# Patient Record
Sex: FEMALE | Race: WHITE | Employment: FULL TIME | URBAN - METROPOLITAN AREA
[De-identification: names, ages, dates, MRNs, and addresses within clinical notes are randomized per-mention and may not be internally consistent; named-entity substitution may affect disease eponyms.]

---

## 2017-01-25 ENCOUNTER — OFFICE VISIT (OUTPATIENT)
Dept: FAMILY MEDICINE CLINIC | Age: 37
End: 2017-01-25

## 2017-01-25 VITALS
SYSTOLIC BLOOD PRESSURE: 141 MMHG | HEIGHT: 63 IN | WEIGHT: 157 LBS | RESPIRATION RATE: 18 BRPM | BODY MASS INDEX: 27.82 KG/M2 | DIASTOLIC BLOOD PRESSURE: 88 MMHG | HEART RATE: 80 BPM | OXYGEN SATURATION: 99 % | TEMPERATURE: 97.3 F

## 2017-01-25 DIAGNOSIS — F98.8 ADD (ATTENTION DEFICIT DISORDER): Primary | ICD-10-CM

## 2017-01-25 DIAGNOSIS — F41.9 ANXIETY: ICD-10-CM

## 2017-01-25 DIAGNOSIS — Z86.32 HISTORY OF GESTATIONAL DIABETES: ICD-10-CM

## 2017-01-25 RX ORDER — ESCITALOPRAM OXALATE 10 MG/1
15 TABLET ORAL DAILY
Qty: 45 TAB | Refills: 1 | Status: SHIPPED | OUTPATIENT
Start: 2017-01-25 | End: 2020-01-18

## 2017-01-25 RX ORDER — LORAZEPAM 0.5 MG/1
0.5 TABLET ORAL
Qty: 30 TAB | Refills: 0 | Status: SHIPPED | OUTPATIENT
Start: 2017-01-25 | End: 2017-03-24

## 2017-01-25 NOTE — PROGRESS NOTES
Off Vyvanse 3 mos from Sept to December. Started back up December. Takes Ativan rarely. Takes Vyvanse daily. [address] printout reviewed. Nurse history read and confirmed by patient. Visit Vitals    /88    Pulse 80    Temp 97.3 °F (36.3 °C)    Resp 18    Ht 5' 3\" (1.6 m)    Wt 157 lb (71.2 kg)    SpO2 99%    Breastfeeding No    BMI 27.81 kg/m2       Patient alert and cooperative. Reviewed above. Assessment:  1. ADD, on chronic Vyvanse. 2. Uses Ativan sparingly for anxiety. On Lexapro. Plan:  1. Refilled meds, Vyvanse for two months pending appointment set up in March with Dr. Rosalinda Dimas, replacing Dr. Lisa He. 2. Follow otherwise here prn.

## 2017-01-25 NOTE — PROGRESS NOTES
Here for recheck and refills. She has an appt on the schedule for the end of March. Has several meds on her med list that need to be removed. She works across [place] at [employer]  Other than her GYN she has not seen any other providers.

## 2017-01-25 NOTE — MR AVS SNAPSHOT
Visit Information Date & Time Provider Department Dept. Phone Encounter #  
 1/25/2017  3:45 PM Camilo Cash MD [facility] [phone] [id number] Follow-up Instructions Return if symptoms worsen or fail to improve, for Has appt with Dr. Eliecer Gong in March. Routing History Your Appointments 3/24/2017  2:00 PM  
New Patient with Cielo Byrne. Eliecer Gong, Danielle 30 ([address]) Appt Note: new patient/get acq; new pt est delroy per Dr Eliecer Gong [address] [facility] [id number]  
[phone]  
  
   
 [address] [facility] [address] [facility] [address] [facility] Upcoming Health Maintenance Date Due  
 PAP AKA CERVICAL CYTOLOGY 12/11/2015 INFLUENZA AGE 9 TO ADULT 2/25/2017* DTaP/Tdap/Td series (2 - Td) 6/1/2019 *Topic was postponed. The date shown is not the original due date. Allergies as of 1/25/2017  Review Complete On: 1/25/2017 By: Giovanna Prater LPN Severity Noted Reaction Type Reactions Prednisone  02/05/2010    Rash Strattera [Atomoxetine]  02/05/2010    Rash Current Immunizations  Reviewed on 9/26/2014 Name Date H1N1 FLU VACCINE 11/1/2009 Human Papillomavirus 6/1/2005, 2/1/2005, 1/1/2005 Influenza Vaccine 9/26/2014 Influenza Vaccine Nasal 11/1/2009 Influenza Vaccine Split 10/5/2010 Rho(D) Immune Globulin - IM 8/10/2016  6:24 PM  
 TDAP Vaccine 6/1/2009 Not reviewed this visit You Were Diagnosed With   
  
 Codes Comments ADD (attention deficit disorder)    -  Primary ICD-10-CM: F98.8 ICD-9-CM: 314.00 Anxiety     ICD-10-CM: F41.9 ICD-9-CM: 300.00 History of gestational diabetes     ICD-10-CM: Z86.32 
ICD-9-CM: V12.21 Vitals BP Pulse Temp Resp Height(growth percentile) Weight(growth percentile) 141/88 80 97.3 °F (36.3 °C) 18 5' 3\" (1.6 m) 157 lb (71.2 kg) SpO2 Breastfeeding? BMI OB Status Smoking Status 99% No 27.81 kg/m2 Injection Never Smoker Vitals History BMI and BSA Data Body Mass Index Body Surface Area  
 27.81 kg/m 2 1.78 m 2 Preferred Pharmacy Pharmacy Name Phone [facility], [address] [phone] Your Updated Medication List  
  
   
This list is accurate as of: 1/25/17  4:27 PM.  Always use your most recent med list.  
  
  
  
  
 escitalopram oxalate 10 mg tablet Commonly known as:  Bourne Bias Take 1.5 Tabs by mouth daily. * lisdexamfetamine 30 mg capsule Commonly known as:  VYVANSE Take 1 Cap (30 mg total) by mouth every morningEarliest Fill Date: 1/25/17. Max Daily Amount: 30 mg  
  
 * lisdexamfetamine 30 mg capsule Commonly known as:  VYVANSE Take 1 Cap (30 mg total) by mouth every morningEarliest Fill Date: 2/21/17. Max Daily Amount: 30 mg  
Start taking on:  2/21/2017 LORazepam 0.5 mg tablet Commonly known as:  ATIVAN Take 1 Tab by mouth every eight (8) hours as needed for Anxiety. Max Daily Amount: 1.5 mg.  
  
 * Notice: This list has 2 medication(s) that are the same as other medications prescribed for you. Read the directions carefully, and ask your doctor or other care provider to review them with you. Prescriptions Printed Refills  
 lisdexamfetamine (VYVANSE) 30 mg capsule 0 Starting on: 2/21/2017 Sig: Take 1 Cap (30 mg total) by mouth every morningEarliest Fill Date: 2/21/17. Max Daily Amount: 30 mg  
 Class: Print Route: Oral  
 lisdexamfetamine (VYVANSE) 30 mg capsule 0 Sig: Take 1 Cap (30 mg total) by mouth every morningEarliest Fill Date: 1/25/17. Max Daily Amount: 30 mg  
 Class: Print Route: Oral  
 LORazepam (ATIVAN) 0.5 mg tablet 0 Sig: Take 1 Tab by mouth every eight (8) hours as needed for Anxiety. Max Daily Amount: 1.5 mg.  
 Class: Print Route: Oral  
  
Prescriptions Sent to Pharmacy Refills  
 escitalopram oxalate (LEXAPRO) 10 mg tablet 1 Sig: Take 1.5 Tabs by mouth daily. Class: Normal  
 Pharmacy: [facility] [address] AT [address] [place] [address] #: [phone] Route: Oral  
  
Follow-up Instructions Return if symptoms worsen or fail to improve, for Has appt with Dr. Dean Campbell in March. Introducing [facility] & [facility] SERVICES! Dear ERICA: Thank you for requesting a EventRegist account. Our records indicate that you already have an active EventRegist account. You can access your account anytime at [email or web address] Did you know that you can access your hospital and ER discharge instructions at any time in EventRegist? You can also review all of your test results from your hospital stay or ER visit. Additional Information If you have questions, please visit the Frequently Asked Questions section of the EventRegist website at [email or web address] Remember, EventRegist is NOT to be used for urgent needs. For medical emergencies, dial 911. Now available from your iPhone and Android! Please provide this summary of care documentation to your next provider. If you have any questions after today's visit, please call [phone].

## 2017-02-11 ENCOUNTER — HOSPITAL ENCOUNTER (EMERGENCY)
Age: 37
Discharge: HOME | End: 2017-02-11
Attending: EMERGENCY MEDICINE

## 2017-02-11 VITALS
RESPIRATION RATE: 15 BRPM | DIASTOLIC BLOOD PRESSURE: 87 MMHG | TEMPERATURE: 97.2 F | SYSTOLIC BLOOD PRESSURE: 142 MMHG | BODY MASS INDEX: 27.46 KG/M2 | HEIGHT: 63 IN | OXYGEN SATURATION: 100 % | HEART RATE: 100 BPM | WEIGHT: 155 LBS

## 2017-02-11 DIAGNOSIS — R31.9 HEMATURIA: Primary | ICD-10-CM

## 2017-02-11 DIAGNOSIS — R10.9 RT FLANK PAIN: ICD-10-CM

## 2017-02-11 LAB
BILIRUB UR QL: NEGATIVE
GLUCOSE UR QL STRIP.AUTO: NEGATIVE MG/DL
HCG UR QL: NEGATIVE
KETONES UR-MCNC: NEGATIVE MG/DL
LEUKOCYTE ESTERASE UR QL STRIP: NEGATIVE
NITRITE UR QL: NEGATIVE
PH UR: 6 [PH] (ref 5–8)
PROT UR QL: NEGATIVE MG/DL
RBC # UR STRIP: ABNORMAL /UL
SP GR UR: 1.02 (ref 1–1.03)
UROBILINOGEN UR QL: 0.2 EU/DL (ref 0.2–1)

## 2017-02-11 RX ORDER — KETOROLAC TROMETHAMINE 30 MG/ML
15 INJECTION, SOLUTION INTRAMUSCULAR; INTRAVENOUS
Status: COMPLETED | OUTPATIENT
Start: 2017-02-11 | End: 2017-02-11

## 2017-02-11 RX ORDER — ONDANSETRON 4 MG/1
4 TABLET, ORALLY DISINTEGRATING ORAL
Qty: 12 TAB | Refills: 0 | Status: SHIPPED | OUTPATIENT
Start: 2017-02-11 | End: 2017-03-24 | Stop reason: ALTCHOICE

## 2017-02-11 RX ORDER — HYDROCODONE BITARTRATE AND ACETAMINOPHEN 5; 300 MG/1; MG/1
1 TABLET ORAL
Qty: 12 TAB | Refills: 0 | Status: SHIPPED | OUTPATIENT
Start: 2017-02-11 | End: 2017-03-24 | Stop reason: ALTCHOICE

## 2017-02-11 RX ADMIN — KETOROLAC TROMETHAMINE 15 MG: 30 INJECTION, SOLUTION INTRAMUSCULAR; INTRAVENOUS at 18:03

## 2017-02-11 NOTE — UC PROVIDER NOTE
Patient is a 30 y.o. female presenting with back pain. The history is provided by the patient. Back Pain    This is a recurrent (last time as about 2 years ago) problem. The current episode started more than 2 days ago. The problem has been gradually worsening. The problem occurs constantly. Patient reports not work related injury. The pain is associated with no known injury. The pain is present in the right side. The quality of the pain is described as stabbing and aching. Radiates to: radiates up and down some and around her side. The pain is at a severity of 9/10. Exacerbated by: not sure. Associated symptoms comments: Blood in her urine last night and today. Past Medical History   Diagnosis Date    Anemia     Diabetes ([facility] [address]      gestational diabetes- on glyburide    Other ill-defined conditions(799.89)      back problems    Psychiatric disorder      ADD anxiety    Pyelonephritis 2011     by symptmoms - [facility]    [facility] [id number]        Past Surgical History   Procedure Laterality Date    Pr  delivery only       2016         Family History   Problem Relation Age of Onset    Liver Disease Mother      cirrhosis    Hypertension Mother     Alcohol abuse Mother     Hypertension Father     Diabetes Father      on insulin    Cancer Father      lung    Alcohol abuse Father     Alcohol abuse Sister     Anxiety Sister     Depression Sister     Alcohol abuse Brother     Anxiety Brother      on lexapro    Hypertension Maternal Grandmother     Stroke Maternal Grandmother 73     aneurysm     Hypertension Paternal Grandmother         Social History     Social History    Marital status: SINGLE     Spouse name: N/A    Number of children: N/A    Years of education: N/A     Occupational History    Pediatric  [employer]     Social History Main Topics    Smoking status: Never Smoker    Smokeless tobacco: Never Used    Alcohol use No    Drug use:  No  Sexual activity: Yes     Partners: Male     Birth control/ protection: Injection     Other Topics Concern     Service No    Blood Transfusions No    Caffeine Concern Yes     3-4 cups coffee, 3-4 sodas daily    Occupational Exposure No    Hobby Hazards No    Sleep Concern No     7-8 hours daily    Stress Concern No    Weight Concern No    Special Diet No    Back Care No    Exercise No     doesn't exercise     Social History Narrative    Works clerical for [employer]    Graduated from [address] [place]: Prednisone and Strattera [atomoxetine]    Review of Systems   Gastrointestinal: Positive for nausea. Genitourinary:        Blood in her urine   Musculoskeletal: Positive for back pain. Vitals:    02/11/17 1702   BP: 142/87   Pulse: 100   Resp: 15   Temp: 97.2 °F (36.2 °C)   SpO2: 100%   Weight: 70.3 kg (155 lb)   Height: 5' 3\" (1.6 m)       Physical Exam   Constitutional: She is oriented to person, place, and time. She appears well-developed and well-nourished. HENT:   Head: Normocephalic. Mouth/Throat: Oropharynx is clear and moist. No oropharyngeal exudate. Eyes: Conjunctivae and EOM are normal.   Cardiovascular: Normal rate, regular rhythm and normal heart sounds. No murmur heard. Pulmonary/Chest: Effort normal and breath sounds normal. No respiratory distress. She has no wheezes. She has no rales. She exhibits no tenderness. Abdominal: Soft. Bowel sounds are normal. She exhibits no distension. There is tenderness (rt CVA tenderness to percussion, suprapubic tenderness, GRR). There is no rebound and no guarding. Musculoskeletal: Normal range of motion. She exhibits no edema or tenderness. Neurological: She is alert and oriented to person, place, and time. Skin: No rash noted. Psychiatric: She has a normal mood and affect. Her behavior is normal.   Nursing note and vitals reviewed.       MDM     Differential Diagnosis; Clinical Impression; Plan:     Pregnancy test offered and pt declined  CLINICAL IMPRESSION:  Hematuria  (primary encounter diagnosis)  Rt flank pain    Plan:  1. Pain meds  2. torodol here  3. zofran for home  Fu with urology  Amount and/or Complexity of Data Reviewed:   Clinical lab tests:  Ordered and reviewed  Risk of Significant Complications, Morbidity, and/or Mortality:   Presenting problems: Moderate  Management options:   Moderate  Progress:   Patient progress:  Stable      Procedures

## 2017-02-11 NOTE — DISCHARGE INSTRUCTIONS
Kidney Stone: Care Instructions  Your Care Instructions    Kidney stones are formed when salts, minerals, and other substances normally found in the urine clump together. They can be as small as grains of sand or, rarely, as large as golf balls. While the stone is traveling through the ureter, which is the tube that carries urine from the kidney to the bladder, you will probably feel pain. The pain may be mild or very severe. You may also have some blood in your urine. As soon as the stone reaches the bladder, any intense pain should go away. If a stone is too large to pass on its own, you may need a medical procedure to help you pass the stone. The doctor has checked you carefully, but problems can develop later. If you notice any problems or new symptoms, get medical treatment right away. Follow-up care is a key part of your treatment and safety. Be sure to make and go to all appointments, and call your doctor if you are having problems. It's also a good idea to know your test results and keep a list of the medicines you take. How can you care for yourself at home? · Drink plenty of fluids, enough so that your urine is light yellow or clear like water. If you have kidney, heart, or liver disease and have to limit fluids, talk with your doctor before you increase the amount of fluids you drink. · Take pain medicines exactly as directed. Call your doctor if you think you are having a problem with your medicine. ¨ If the doctor gave you a prescription medicine for pain, take it as prescribed. ¨ If you are not taking a prescription pain medicine, ask your doctor if you can take an over-the-counter medicine. Read and follow all instructions on the label. · Your doctor may ask you to strain your urine so that you can collect your kidney stone when it passes. You can use a kitchen strainer or a tea strainer to catch the stone. Store it in a plastic bag until you see your doctor again.   Preventing future kidney stones  Some changes in your diet may help prevent kidney stones. Depending on the cause of your stones, your doctor may recommend that you:  · Drink plenty of fluids, enough so that your urine is light yellow or clear like water. If you have kidney, heart, or liver disease and have to limit fluids, talk with your doctor before you increase the amount of fluids you drink. · Limit coffee, tea, and alcohol. Also avoid grapefruit juice. · Do not take more than the recommended daily dose of vitamins C and D.  · Avoid antacids such as Gaviscon, Maalox, Mylanta, or Tums. · Limit the amount of salt (sodium) in your diet. · Eat a balanced diet that is not too high in protein. · Limit foods that are high in a substance called oxalate, which can cause kidney stones. These foods include dark green vegetables, rhubarb, chocolate, wheat bran, nuts, cranberries, and beans. When should you call for help? Call your doctor now or seek immediate medical care if:  · You cannot keep down fluids. · Your pain gets worse. · You have a fever or chills. · You have new or worse pain in your back just below your rib cage (the flank area). · You have new or more blood in your urine. Watch closely for changes in your health, and be sure to contact your doctor if:  · You do not get better as expected. Where can you learn more? Go to http://pepe-jazzy.info/. Enter V910 in the search box to learn more about \"Kidney Stone: Care Instructions. \"  Current as of: November 20, 2015  Content Version: 11.1  © 9760-4969 [employer]. Care instructions adapted under license by [employer] (which disclaims liability or warranty for this information). If you have questions about a medical condition or this instruction, always ask your healthcare professional. [address] any warranty or liability for your use of this information.

## 2017-03-24 ENCOUNTER — OFFICE VISIT (OUTPATIENT)
Dept: FAMILY MEDICINE CLINIC | Age: 37
End: 2017-03-24

## 2017-03-24 VITALS
RESPIRATION RATE: 18 BRPM | HEART RATE: 89 BPM | HEIGHT: 63 IN | WEIGHT: 156 LBS | OXYGEN SATURATION: 100 % | SYSTOLIC BLOOD PRESSURE: 138 MMHG | DIASTOLIC BLOOD PRESSURE: 93 MMHG | TEMPERATURE: 98.1 F | BODY MASS INDEX: 27.64 KG/M2

## 2017-03-24 DIAGNOSIS — N63.0 BREAST MASS: ICD-10-CM

## 2017-03-24 DIAGNOSIS — I10 ESSENTIAL HYPERTENSION: ICD-10-CM

## 2017-03-24 DIAGNOSIS — Z79.899 LONG-TERM USE OF HIGH-RISK MEDICATION: ICD-10-CM

## 2017-03-24 DIAGNOSIS — F41.9 ANXIETY: ICD-10-CM

## 2017-03-24 DIAGNOSIS — F98.8 ADD (ATTENTION DEFICIT DISORDER): Primary | ICD-10-CM

## 2017-03-24 DIAGNOSIS — Z86.32 HISTORY OF GESTATIONAL DIABETES: ICD-10-CM

## 2017-03-24 RX ORDER — MEDROXYPROGESTERONE ACETATE 150 MG/ML
INJECTION, SUSPENSION INTRAMUSCULAR
Refills: 4 | COMMUNITY
Start: 2017-03-07 | End: 2020-01-18

## 2017-03-24 RX ORDER — LISDEXAMFETAMINE DIMESYLATE 40 MG/1
CAPSULE ORAL
Refills: 0 | COMMUNITY
Start: 2016-12-19 | End: 2017-03-24 | Stop reason: DRUGHIGH

## 2017-03-24 NOTE — PROGRESS NOTES
[address] Visit   Patient ID:   Cora Lemons is a 30 y.o. female. Assessment/Plan:    Carmela Cowden was seen today for establish care, medication refill and breast mass. Diagnoses and all orders for this visit:    ADD (attention deficit disorder)  ADHD is adequately controlled with current plan    · Diagnosed in 9th grade by Dr. Coy Freeman, pediatrician. · Patient's plan currently includes: Vyvanse 30 mg daily  · refills given for 3 months    · Functional improvements that justify chronic stimulant medications: Yes  HR and BP reviewed. Heart rate appropriate however BP is elevated please see hypertension below  Mood managed. She does have anxiety which is managed with Lexapro  No side effects   · In the past patient has tried: straterra (rash), adderall (jittery)    · Treatment agreement was signed by pt and myself on: 3/24/2017   ·  appropriate and last checked on: 3/24/2017  · Utox 3/24/2017     -     lisdexamfetamine (VYVANSE) 30 mg capsule; Take 1 Cap (30 mg total) by mouth every morningEarliest Fill Date: 3/24/17. Max Daily Amount: 30 mg  -     lisdexamfetamine (VYVANSE) 30 mg capsule; Take 1 Cap (30 mg total) by mouth every morningEarliest Fill Date: 4/23/17. Max Daily Amount: 30 mg  -     COMPLIANCE DRUG SCREEN/PRESCRIPTION MONITORING  -     lisdexamfetamine (VYVANSE) 30 mg capsule; Take 1 Cap (30 mg total) by mouth every morningEarliest Fill Date: 5/23/17. Max Daily Amount: 30 mg    Anxiety  Anxiety well managed with Lexapro. The patient so she does not require Ativan. Patient is therefore agreeable to discontinuing Ativan after the current prescription is complete. I have removed Ativan from her medication list if she has a urine that returns positive for that might be from her prior prescription and this is allowable.  However will not be prescribing any additional Ativan at this time    History of gestational diabetes  She has a history of gestational diabetes during pregnancy and is due for routine diabetes screening. She will complete this at a lab visit before next appointment.  -     HEMOGLOBIN A1C WITH EAG    mass  There is no breast mass. The mass that she felt is simply her xiphoid process. Provide reassurance. Essential hypertension  She had problems with her blood pressure in pregnancy. This is a new diagnosis of hypertension for her. She is reluctant to add any additional medications at this time. Therefore we discussed the importance of healthy food choices, physical activity, weight loss. She was given information on the DASH diet. We will reevaluate in approximately 3 months. If she is not at goal she is aware that we will need to start medication    Long-term use of high-risk medication  -     COMPLIANCE DRUG SCREEN/PRESCRIPTION MONITORING      Counselled pt on:  Patient health concerns, plan as outlined in patient instructions and above. Patient was offered a choice/choices in the treatment plan today. Patient expresses understanding of the plan and agrees with recommendations. minute encounter was spent in counseling and coordination of care today. Patient Instructions   TODAY, please go to:   CHECK OUT       Please schedule the following appointments at [facility] OUT:  · Lab visit in 3 months  · ADD, Blood pressure, and A1C follow up with Dr. Dayton Pierce in 3 months  _____________________     Today's Plan:  · Start walking more and improve food choices for blood pressure. Otherwise you will need medication  · Continue vyvanse  · Return or diabetes screen before next visit.   _____________________     Review your health maintenance below. Make plans to return and address anything that is due or will be due soon. There are no preventive care reminders to display for this patient.     Subjective:   HPI:  Lobo Walton is a 30 y.o. female being seen for:   Chief Complaint   Patient presents with   Nolia Stamp Establish Care    Medication Refill    Breast Mass stated that there is a lump located under both breast        Establish Care  Past medical history, surgical history, social history, family history, medications, allergies reviewed and updated. See below for more detail    Elevated BP  ·  elevated since recent pregnancy. Delivered 7 months ago  · Walks at baseball     ADHD  Concerns since last visit: no  Current Medications: vyvanse 30mg  Past medications: straterra (rash), adderall (jittery), recently decreased dose of vyvanse from 40mg to 30mg  Behavioral Therapy: no  Work Performance: going well  Home: going well when on medication   Appetite- ok  Dizziness- no  Insomnia- no  Mood lability- no  Tics- no  Psychosis- no  · Misuse/abuse- no     anxiety  · Takes lexapro for a few years  · Taking ativan for a few years. May use about once a week. · No counselor now or in the past  · Went without ativan during pregnancy    Breast mass   · Noticed when pregnant, mid chest lump  · Stable in size  · Tender, but not painful spontaneously  · Nl overlying skin change  · No fhx breast cancer  · No lad in axilla or neck  · No nipple discharge     Review of Systems  Otherwise, per HPI  Active Problem List:  Patient Active Problem List   Diagnosis Code    ADD (attention deficit disorder) F98.8    Anxiety F41.9    History of gestational diabetes Z80.34     ? Objective:     Visit Vitals    BP (!) 138/93 (BP 1 Location: Left arm, BP Patient Position: Sitting)    Pulse 89    Temp 98.1 °F (36.7 °C) (Oral)    Resp 18    Ht 5' 3\" (1.6 m)    Wt 156 lb (70.8 kg)    SpO2 100%    BMI 27.63 kg/m2     Wt Readings from Last 3 Encounters:   03/24/17 156 lb (70.8 kg)   02/11/17 155 lb (70.3 kg)   01/25/17 157 lb (71.2 kg)     PHQ 2 / 9, over the last two weeks 1/25/2017   Little interest or pleasure in doing things Not at all   Feeling down, depressed or hopeless Not at all   Total Score PHQ 2 0     Physical Exam   Constitutional: She appears well-developed and well-nourished. No distress. Pulmonary/Chest: Effort normal. No respiratory distress. Neurological: She is alert. Psychiatric: She has a normal mood and affect. Her behavior is normal.     Allergies   Allergen Reactions    Prednisone Rash    Strattera [Atomoxetine] Rash     Prior to Admission medications    Medication Sig Start Date End Date Taking? Authorizing Provider   medroxyPROGESTERone (DEPO-PROVERA) 150 mg/mL syrg INJECT 1 ML IM Q 12 WEEKS 3/7/17  Yes Historical Provider   lisdexamfetamine (VYVANSE) 30 mg capsule Take 1 Cap (30 mg total) by mouth every morningEarliest Fill Date: 3/24/17. Max Daily Amount: 30 mg 3/24/17  Yes Rylee Olivas. Olivia Purdy MD   lisdexamfetamine (VYVANSE) 30 mg capsule Take 1 Cap (30 mg total) by mouth every morningEarliest Fill Date: 4/23/17. Max Daily Amount: 30 mg 4/23/17  Yes Rylee Olivas. Olivia Purdy MD   lisdexamfetamine (VYVANSE) 30 mg capsule Take 1 Cap (30 mg total) by mouth every morningEarliest Fill Date: 5/23/17. Max Daily Amount: 30 mg 5/23/17  Yes Rylee Olivas. Olivia Purdy MD   escitalopram oxalate (LEXAPRO) 10 mg tablet Take 1.5 Tabs by mouth daily.  1/25/17  Yes Kathie Cruz MD

## 2017-03-24 NOTE — MR AVS SNAPSHOT
Visit Information Date & Time Provider Department Dept. Phone Encounter #  
 3/24/2017  2:00 PM Madelyn Padronjennifer Greer MD [facility] [phone] [id number] Upcoming Health Maintenance Date Due  
 PAP AKA CERVICAL CYTOLOGY 6/22/2017* DTaP/Tdap/Td series (2 - Td) 6/1/2019 *Topic was postponed. The date shown is not the original due date. Allergies as of 3/24/2017  Review Complete On: 3/24/2017 By: Clemente Ferguson. Gail Greer MD  
  
 Severity Noted Reaction Type Reactions Prednisone  02/05/2010    Rash Strattera [Atomoxetine]  02/05/2010    Rash Current Immunizations  Reviewed on 9/26/2014 Name Date H1N1 FLU VACCINE 11/1/2009 Human Papillomavirus 6/1/2005, 2/1/2005, 1/1/2005 Influenza Vaccine 9/26/2014 Influenza Vaccine Nasal 11/1/2009 Influenza Vaccine Split 10/5/2010 Rho(D) Immune Globulin - IM 8/10/2016  6:24 PM  
 TDAP Vaccine 6/1/2009 Not reviewed this visit You Were Diagnosed With   
  
 Codes Comments ADD (attention deficit disorder)    -  Primary ICD-10-CM: F98.8 ICD-9-CM: 314.00 Anxiety     ICD-10-CM: F41.9 ICD-9-CM: 300.00 History of gestational diabetes     ICD-10-CM: Z86.32 
ICD-9-CM: V12.21 Breast mass     ICD-10-CM: C17 
ICD-9-CM: 611.72 Essential hypertension     ICD-10-CM: I10 
ICD-9-CM: 401.9 Long-term use of high-risk medication     ICD-10-CM: Z79.899 ICD-9-CM: V58.69 Vitals BP Pulse Temp Resp Height(growth percentile) Weight(growth percentile) (!) 138/93 (BP 1 Location: Left arm, BP Patient Position: Sitting) 89 98.1 °F (36.7 °C) (Oral) 18 5' 3\" (1.6 m) 156 lb (70.8 kg) SpO2 BMI OB Status Smoking Status 100% 27.63 kg/m2 Injection Never Smoker BMI and BSA Data Body Mass Index Body Surface Area  
 27.63 kg/m 2 1.77 m 2 Preferred Pharmacy Pharmacy Name Phone  [facility] [address] AT [address] [place] [address] Rd OF   [facility] [address] [phone] Your Updated Medication List  
  
   
This list is accurate as of: 3/24/17  3:00 PM.  Always use your most recent med list.  
  
  
  
  
 escitalopram oxalate 10 mg tablet Commonly known as:  Darcus Skillern Take 1.5 Tabs by mouth daily. * lisdexamfetamine 30 mg capsule Commonly known as:  VYVANSE Take 1 Cap (30 mg total) by mouth every morningEarliest Fill Date: 3/24/17. Max Daily Amount: 30 mg  
  
 * lisdexamfetamine 30 mg capsule Commonly known as:  VYVANSE Take 1 Cap (30 mg total) by mouth every morningEarliest Fill Date: 4/23/17. Max Daily Amount: 30 mg  
Start taking on:  4/23/2017 * lisdexamfetamine 30 mg capsule Commonly known as:  VYVANSE Take 1 Cap (30 mg total) by mouth every morningEarliest Fill Date: 5/23/17. Max Daily Amount: 30 mg  
Start taking on:  5/23/2017  
  
 medroxyPROGESTERone 150 mg/mL Syrg Commonly known as:  DEPO-PROVERA INJECT 1 ML IM Q 12 WEEKS * Notice: This list has 3 medication(s) that are the same as other medications prescribed for you. Read the directions carefully, and ask your doctor or other care provider to review them with you. Prescriptions Printed Refills  
 lisdexamfetamine (VYVANSE) 30 mg capsule 0 Sig: Take 1 Cap (30 mg total) by mouth every morningEarliest Fill Date: 3/24/17. Max Daily Amount: 30 mg  
 Class: Print Route: Oral  
 lisdexamfetamine (VYVANSE) 30 mg capsule 0 Starting on: 4/23/2017 Sig: Take 1 Cap (30 mg total) by mouth every morningEarliest Fill Date: 4/23/17. Max Daily Amount: 30 mg  
 Class: Print Route: Oral  
 lisdexamfetamine (VYVANSE) 30 mg capsule 0 Starting on: 5/23/2017 Sig: Take 1 Cap (30 mg total) by mouth every morningEarliest Fill Date: 5/23/17. Max Daily Amount: 30 mg  
 Class: Print Route: Oral  
  
We Performed the Following [address] MONITORING [[id number] Custom] HEMOGLOBIN A1C WITH EAG [18635 CPT(R)] Patient Instructions TODAY, please go to: CHECK OUT Please schedule the following appointments at [facility] OUT: 
· Lab visit in 3 months · ADD, Blood pressure, and A1C follow up with Dr. Chauncey Curtis in 3 months 
_____________________ Today's Plan: 
· Start walking more and improve food choices for blood pressure. Otherwise you will need medication · Continue vyvanse · Return or diabetes screen before next visit.  
_____________________ Review your health maintenance below. Make plans to return and address anything that is due or will be due soon. There are no preventive care reminders to display for this patient. DASH Diet: Care Instructions Your Care Instructions The DASH diet is an eating plan that can help lower your blood pressure. DASH stands for Dietary Approaches to Stop Hypertension. Hypertension is high blood pressure. The DASH diet focuses on eating foods that are high in calcium, potassium, and magnesium. These nutrients can lower blood pressure. The foods that are highest in these nutrients are fruits, vegetables, low-fat dairy products, nuts, seeds, and legumes. But taking calcium, potassium, and magnesium supplements instead of eating foods that are high in those nutrients does not have the same effect. The DASH diet also includes whole grains, fish, and poultry. The DASH diet is one of several lifestyle changes your doctor may recommend to lower your high blood pressure. Your doctor may also want you to decrease the amount of sodium in your diet. Lowering sodium while following the DASH diet can lower blood pressure even further than just the DASH diet alone. Follow-up care is a key part of your treatment and safety. Be sure to make and go to all appointments, and call your doctor if you are having problems. It's also a good idea to know your test results and keep a list of the medicines you take. How can you care for yourself at home? Following the DASH diet · Eat 4 to 5 servings of fruit each day. A serving is 1 medium-sized piece of fruit, ½ cup chopped or canned fruit, 1/4 cup dried fruit, or 4 ounces (½ cup) of fruit juice. Choose fruit more often than fruit juice. · Eat 4 to 5 servings of vegetables each day. A serving is 1 cup of lettuce or raw leafy vegetables, ½ cup of chopped or cooked vegetables, or 4 ounces (½ cup) of vegetable juice. Choose vegetables more often than vegetable juice. · Get 2 to 3 servings of low-fat and fat-free dairy each day. A serving is 8 ounces of milk, 1 cup of yogurt, or 1 ½ ounces of cheese. · Eat 6 to 8 servings of grains each day. A serving is 1 slice of bread, 1 ounce of dry cereal, or ½ cup of cooked rice, pasta, or cooked cereal. Try to choose whole-grain products as much as possible. · Limit lean meat, poultry, and fish to 2 servings each day. A serving is 3 ounces, about the size of a deck of cards. · Eat 4 to 5 servings of nuts, seeds, and legumes (cooked dried beans, lentils, and split peas) each week. A serving is 1/3 cup of nuts, 2 tablespoons of seeds, or ½ cup of cooked beans or peas. · Limit fats and oils to 2 to 3 servings each day. A serving is 1 teaspoon of vegetable oil or 2 tablespoons of salad dressing. · Limit sweets and added sugars to 5 servings or less a week. A serving is 1 tablespoon jelly or jam, ½ cup sorbet, or 1 cup of lemonade. · Eat less than 2,300 milligrams (mg) of sodium a day. If you limit your sodium to 1,500 mg a day, you can lower your blood pressure even more. Tips for success · Start small. Do not try to make dramatic changes to your diet all at once. You might feel that you are missing out on your favorite foods and then be more likely to not follow the plan. Make small changes, and stick with them. Once those changes become habit, add a few more changes. · Try some of the following: ¨ Make it a goal to eat a fruit or vegetable at every meal and at snacks. This will make it easy to get the recommended amount of fruits and vegetables each day. ¨ Try yogurt topped with fruit and nuts for a snack or healthy dessert. ¨ Add lettuce, tomato, cucumber, and onion to sandwiches. ¨ Combine a ready-made pizza crust with low-fat mozzarella cheese and lots of vegetable toppings. Try using tomatoes, squash, spinach, broccoli, carrots, cauliflower, and onions. ¨ Have a variety of cut-up vegetables with a low-fat dip as an appetizer instead of chips and dip. ¨ Sprinkle sunflower seeds or chopped almonds over salads. Or try adding chopped walnuts or almonds to cooked vegetables. ¨ Try some vegetarian meals using beans and peas. Add garbanzo or kidney beans to salads. Make burritos and tacos with mashed reinoso beans or black beans. Where can you learn more? Go to http://pepe-jazzy.info/. Enter M382 in the search box to learn more about \"DASH Diet: Care Instructions. \" Current as of: March 23, 2016 Content Version: 11.1 © 5566-9646 DeliRadio, Anvato. Care instructions adapted under license by Desi Hits (which disclaims liability or warranty for this information). If you have questions about a medical condition or this instruction, always ask your healthcare professional. [place] [address] any warranty or liability for your use of this information. Introducing [facility] & HEALTH SERVICES! Dear Jose Karimi: Thank you for requesting a Moto Europa account. Our records indicate that you already have an active Moto Europa account. You can access your account anytime at [email or web address] Did you know that you can access your hospital and ER discharge instructions at any time in Moto Europa? You can also review all of your test results from your hospital stay or ER visit. Additional Information If you have questions, please visit the Frequently Asked Questions section of the [facility] website at [email or web address] Remember, Onyu is NOT to be used for urgent needs. For medical emergencies, dial 911. Now available from your iPhone and Android! Please provide this summary of care documentation to your next provider. Your primary care clinician is listed as Bridget Tsang. Annemarie Rendon. If you have any questions after today's visit, please call [phone].

## 2017-03-24 NOTE — PROGRESS NOTES
Chief Complaint   Patient presents with    [facility]    Medication Refill    Breast Mass      stated that there is a lump located under both breast      Patient came into clinic with pill bottle containing vyvanse 30 mg capsule with one capsule remaining, bottle returned to patient. AR 03/24/17     1. Have you been to the ER, urgent care clinic since your last visit? Hospitalized since your last visit? Yes, ER visit for kidney stones    2. Have you seen or consulted any other health care providers outside of the [facility] since your last visit? Include any pap smears or colon screening. No     The patient was counseled on the dangers of tobacco use, and was advised never to start  . Reviewed strategies to maximize success, including never to start . Health Maintenance Due   Topic Date Due    PAP AKA CERVICAL CYTOLOGY  Discussed with patient today and advised to follow up. Stated it was completed, records will be requested. 12/11/2015    INFLUENZA AGE 9 TO ADULT Discussed with patient today and advised to follow up. Stated it was completed in October 2016.   08/01/2016     ACP is not on file, advised to return.

## 2017-03-24 NOTE — PATIENT INSTRUCTIONS
TODAY, please go to:   CHECK OUT       Please schedule the following appointments at [facility] OUT:  · Lab visit in 3 months  · ADD, Blood pressure, and A1C follow up with Dr. Albaro Lima in 3 months  _____________________     Today's Plan:  · Start walking more and improve food choices for blood pressure. Otherwise you will need medication  · Continue vyvanse  · Return or diabetes screen before next visit.   _____________________     Review your health maintenance below. Make plans to return and address anything that is due or will be due soon. There are no preventive care reminders to display for this patient. DASH Diet: Care Instructions  Your Care Instructions  The DASH diet is an eating plan that can help lower your blood pressure. DASH stands for Dietary Approaches to Stop Hypertension. Hypertension is high blood pressure. The DASH diet focuses on eating foods that are high in calcium, potassium, and magnesium. These nutrients can lower blood pressure. The foods that are highest in these nutrients are fruits, vegetables, low-fat dairy products, nuts, seeds, and legumes. But taking calcium, potassium, and magnesium supplements instead of eating foods that are high in those nutrients does not have the same effect. The DASH diet also includes whole grains, fish, and poultry. The DASH diet is one of several lifestyle changes your doctor may recommend to lower your high blood pressure. Your doctor may also want you to decrease the amount of sodium in your diet. Lowering sodium while following the DASH diet can lower blood pressure even further than just the DASH diet alone. Follow-up care is a key part of your treatment and safety. Be sure to make and go to all appointments, and call your doctor if you are having problems. It's also a good idea to know your test results and keep a list of the medicines you take. How can you care for yourself at home?   Following the DASH diet  · Eat 4 to 5 servings of fruit each day. A serving is 1 medium-sized piece of fruit, ½ cup chopped or canned fruit, 1/4 cup dried fruit, or 4 ounces (½ cup) of fruit juice. Choose fruit more often than fruit juice. · Eat 4 to 5 servings of vegetables each day. A serving is 1 cup of lettuce or raw leafy vegetables, ½ cup of chopped or cooked vegetables, or 4 ounces (½ cup) of vegetable juice. Choose vegetables more often than vegetable juice. · Get 2 to 3 servings of low-fat and fat-free dairy each day. A serving is 8 ounces of milk, 1 cup of yogurt, or 1 ½ ounces of cheese. · Eat 6 to 8 servings of grains each day. A serving is 1 slice of bread, 1 ounce of dry cereal, or ½ cup of cooked rice, pasta, or cooked cereal. Try to choose whole-grain products as much as possible. · Limit lean meat, poultry, and fish to 2 servings each day. A serving is 3 ounces, about the size of a deck of cards. · Eat 4 to 5 servings of nuts, seeds, and legumes (cooked dried beans, lentils, and split peas) each week. A serving is 1/3 cup of nuts, 2 tablespoons of seeds, or ½ cup of cooked beans or peas. · Limit fats and oils to 2 to 3 servings each day. A serving is 1 teaspoon of vegetable oil or 2 tablespoons of salad dressing. · Limit sweets and added sugars to 5 servings or less a week. A serving is 1 tablespoon jelly or jam, ½ cup sorbet, or 1 cup of lemonade. · Eat less than 2,300 milligrams (mg) of sodium a day. If you limit your sodium to 1,500 mg a day, you can lower your blood pressure even more. Tips for success  · Start small. Do not try to make dramatic changes to your diet all at once. You might feel that you are missing out on your favorite foods and then be more likely to not follow the plan. Make small changes, and stick with them. Once those changes become habit, add a few more changes. · Try some of the following:  ¨ Make it a goal to eat a fruit or vegetable at every meal and at snacks.  This will make it easy to get the recommended amount of fruits and vegetables each day. ¨ Try yogurt topped with fruit and nuts for a snack or healthy dessert. ¨ Add lettuce, tomato, cucumber, and onion to sandwiches. ¨ Combine a ready-made pizza crust with low-fat mozzarella cheese and lots of vegetable toppings. Try using tomatoes, squash, spinach, broccoli, carrots, cauliflower, and onions. ¨ Have a variety of cut-up vegetables with a low-fat dip as an appetizer instead of chips and dip. ¨ Sprinkle sunflower seeds or chopped almonds over salads. Or try adding chopped walnuts or almonds to cooked vegetables. ¨ Try some vegetarian meals using beans and peas. Add garbanzo or kidney beans to salads. Make burritos and tacos with mashed reinoso beans or black beans. Where can you learn more? Go to http://pepe-jazzy.info/. Enter V679 in the search box to learn more about \"DASH Diet: Care Instructions. \"  Current as of: March 23, 2016  Content Version: 11.1  © 6196-7593 Talend. Care instructions adapted under license by Buzzmetrics (which disclaims liability or warranty for this information). If you have questions about a medical condition or this instruction, always ask your healthcare professional. [address] any warranty or liability for your use of this information.

## 2017-06-21 LAB
EST. AVERAGE GLUCOSE BLD GHB EST-MCNC: 117 MG/DL
HBA1C MFR BLD: 5.7 % (ref 4.8–5.6)

## 2017-06-21 NOTE — PROGRESS NOTES
Will review results in detail at upcoming office visit. 6/27/2017  1:20 PM    Larry Veliz.  Chauncey Curtis MD       [address] [place] [address]

## 2017-06-26 LAB — DRUGS UR: NORMAL

## 2017-06-27 ENCOUNTER — OFFICE VISIT (OUTPATIENT)
Dept: FAMILY MEDICINE CLINIC | Age: 37
End: 2017-06-27

## 2017-06-27 VITALS
SYSTOLIC BLOOD PRESSURE: 117 MMHG | RESPIRATION RATE: 16 BRPM | HEIGHT: 63 IN | BODY MASS INDEX: 27.64 KG/M2 | DIASTOLIC BLOOD PRESSURE: 83 MMHG | WEIGHT: 156 LBS | HEART RATE: 87 BPM | OXYGEN SATURATION: 100 %

## 2017-06-27 DIAGNOSIS — I10 ESSENTIAL HYPERTENSION: ICD-10-CM

## 2017-06-27 DIAGNOSIS — F98.8 ADD (ATTENTION DEFICIT DISORDER): Primary | ICD-10-CM

## 2017-06-27 DIAGNOSIS — R73.03 PREDIABETES: ICD-10-CM

## 2017-06-27 NOTE — MR AVS SNAPSHOT
Visit Information Date & Time Provider Department Dept. Phone Encounter #  
 6/27/2017  1:20 PM Alla Baker. Humaira Reyna MD [facility] [phone] [id number] Upcoming Health Maintenance Date Due  
 PAP AKA CERVICAL CYTOLOGY 12/11/2015 INFLUENZA AGE 9 TO ADULT 8/1/2017 DTaP/Tdap/Td series (2 - Td) 6/1/2019 Allergies as of 6/27/2017  Review Complete On: 6/27/2017 By: Leticia Hernandez LPN Severity Noted Reaction Type Reactions Prednisone  02/05/2010    Rash Strattera [Atomoxetine]  02/05/2010    Rash Current Immunizations  Reviewed on 9/26/2014 Name Date H1N1 FLU VACCINE 11/1/2009 Human Papillomavirus 6/1/2005, 2/1/2005, 1/1/2005 Influenza Vaccine 9/26/2014 Influenza Vaccine Nasal 11/1/2009 Influenza Vaccine Split 10/5/2010 Rho(D) Immune Globulin - IM 8/10/2016  6:24 PM  
 TDAP Vaccine 6/1/2009 Not reviewed this visit You Were Diagnosed With   
  
 Codes Comments ADD (attention deficit disorder)     ICD-10-CM: F98.8 ICD-9-CM: 314.00 Vitals BP Pulse Resp Height(growth percentile) Weight(growth percentile) SpO2  
 117/83 (BP 1 Location: Left arm, BP Patient Position: Sitting) 87 16 5' 3\" (1.6 m) 156 lb (70.8 kg) 100% BMI OB Status Smoking Status 27.63 kg/m2 Injection Never Smoker BMI and BSA Data Body Mass Index Body Surface Area  
 27.63 kg/m 2 1.77 m 2 Preferred Pharmacy Pharmacy Name Phone [facility], [address] [place] [address] AT [address] [place] [address] Drive [phone] Your Updated Medication List  
  
   
This list is accurate as of: 6/27/17  2:06 PM.  Always use your most recent med list.  
  
  
  
  
 escitalopram oxalate 10 mg tablet Commonly known as:  Aleatha Bernheim Take 1.5 Tabs by mouth daily. * lisdexamfetamine 30 mg capsule Commonly known as:  VYVANSE  
 Take 1 Cap (30 mg total) by mouth every morning. Max Daily Amount: 30 mg  
  
 * lisdexamfetamine 30 mg capsule Commonly known as:  VYVANSE Take 1 Cap (30 mg total) by mouth every morningEarliest Fill Date: 7/27/17. Max Daily Amount: 30 mg  
Start taking on:  7/27/2017 * lisdexamfetamine 30 mg capsule Commonly known as:  VYVANSE Take 1 Cap (30 mg total) by mouth every morningEarliest Fill Date: 8/26/17. Max Daily Amount: 30 mg  
Start taking on:  8/26/2017  
  
 medroxyPROGESTERone 150 mg/mL Syrg Commonly known as:  DEPO-PROVERA INJECT 1 ML IM Q 12 WEEKS * Notice: This list has 3 medication(s) that are the same as other medications prescribed for you. Read the directions carefully, and ask your doctor or other care provider to review them with you. Prescriptions Printed Refills  
 lisdexamfetamine (VYVANSE) 30 mg capsule 0 Sig: Take 1 Cap (30 mg total) by mouth every morning. Max Daily Amount: 30 mg  
 Class: Print Route: Oral  
 lisdexamfetamine (VYVANSE) 30 mg capsule 0 Starting on: 7/27/2017 Sig: Take 1 Cap (30 mg total) by mouth every morningEarliest Fill Date: 7/27/17. Max Daily Amount: 30 mg  
 Class: Print Route: Oral  
 lisdexamfetamine (VYVANSE) 30 mg capsule 0 Starting on: 8/26/2017 Sig: Take 1 Cap (30 mg total) by mouth every morningEarliest Fill Date: 8/26/17. Max Daily Amount: 30 mg  
 Class: Print Route: Oral  
  
Patient Instructions TODAY, please go to: CHECK OUT Please schedule the following appointments at CHECK OUT: 
· ADHD medication follow up with Dr. Tita Rollins in 3 months 
_____________________ Today's Plan: 
· Have pap smear done with [facility]. Have them send us a record when it is done. · Good news! Your blood pressure is normal today. Check it periodically at home. Goal is less than 140/90. You have Prediabetes.  This means your blood sugars are higher than normal on average. The following can help you improve and control your blood sugar. · Food Choices · Be Mindful of CARBOHYDRATES. Carbohydrates= sugars. This means breads, pasta, rice, chips, desserts, starchy vegetables, etc. 
· Decrease how OFTEN you have carbohydrates · Decrease how MUCH carbohydrates you eat at one time · Choose carbs that are better for you, like whole grain. · Try to eliminate sugar from your drinks. (Soda, sweet tea, lemonade, juice, gatorade, alcohol, etc). · Let Dr. Rakel Calvert know if you would like to see Nutrition to discuss more · Physical Activity · Move more. · Try to walk 150 minutes per week. · Try to walk 10,000 steps per day · Consider counting your steps on your smart phone. · Weight Loss · Moving more and eating better are good for you no matter what. · If you go enough of them you can achieve a healthier weight. This will help your blood sugars. · Medication · Sometimes you need medicine to help. Metformin can help. You would take this by mouth every day.  
 
_____________________ Review your health maintenance below. Make plans to return and address anything that is due or will be due soon. Health Maintenance Due Topic Date Due  Cervical Cancer Screening  12/11/2015  
 
 
 
_____________________ Are you stressed out? Overwhelmed? In pain? Feeling disconnected? Consider MINDFULNESS. .. 
[email or web address] 
_____________________ If you need to get your labs done at [facility], visit this site to find a convenient location: [email or web address] Introducing \[facility]\"" & HEALTH SERVICES! Dear Daljit Retanaer: Thank you for requesting a Physicians Formula account. Our records indicate that you already have an active Physicians Formula account. You can access your account anytime at [email or web address] Did you know that you can access your hospital and ER discharge instructions at any time in YuuConnect? You can also review all of your test results from your hospital stay or ER visit. Additional Information If you have questions, please visit the Frequently Asked Questions section of the YuuConnect website at [email or web address] Remember, YuuConnect is NOT to be used for urgent needs. For medical emergencies, dial 911. Now available from your iPhone and Android! Please provide this summary of care documentation to your next provider. Your primary care clinician is listed as David Cameron. Angela Garvin. If you have any questions after today's visit, please call [phone].

## 2017-06-27 NOTE — PATIENT INSTRUCTIONS
TODAY, please go to:   CHECK OUT     Please schedule the following appointments at CHECK OUT:  · ADHD medication follow up with Dr. Sheree Lambert in 3 months  _____________________     RQSANDRA'I Plan:  · Have pap smear done with OBGYN. Have them send us a record when it is done. · Good news! Your blood pressure is normal today. Check it periodically at home. Goal is less than 140/90. You have Prediabetes. This means your blood sugars are higher than normal on average. The following can help you improve and control your blood sugar. · Food Choices  · Be Mindful of CARBOHYDRATES. Carbohydrates= sugars. This means breads, pasta, rice, chips, desserts, starchy vegetables, etc.  · Decrease how OFTEN you have carbohydrates  · Decrease how MUCH carbohydrates you eat at one time  · Choose carbs that are better for you, like whole grain. · Try to eliminate sugar from your drinks. (Soda, sweet tea, lemonade, juice, gatorade, alcohol, etc). · Let Dr. Sheree Lambert know if you would like to see Nutrition to discuss more  · Physical Activity  · Move more. · Try to walk 150 minutes per week. · Try to walk 10,000 steps per day  · Consider counting your steps on your smart phone. · Weight Loss  · Moving more and eating better are good for you no matter what. · If you go enough of them you can achieve a healthier weight. This will help your blood sugars. · Medication  · Sometimes you need medicine to help. Metformin can help. You would take this by mouth every day.     _____________________     Review your health maintenance below. Make plans to return and address anything that is due or will be due soon. Health Maintenance Due   Topic Date Due    Cervical Cancer Screening  12/11/2015         _____________________     Are you stressed out? Overwhelmed? In pain? Feeling disconnected? Consider MINDFULNESS. ..  [email or web address]  _____________________     If you need to get your labs done at [facility], visit this site to find a convenient location: [email or web address]

## 2017-06-27 NOTE — PROGRESS NOTES
.. [address] Visit   Patient ID:   Teddy Thibodeaux is a 30 y.o. female. Assessment/Plan:    Daljit Xavier was seen today for attention deficit disorder, blood pressure check and results. Diagnoses and all orders for this visit:    ADD (attention deficit disorder)  ADD is adequately controlled with current plan     · Diagnosed in 9th grade by Dr. Erick Spring, pediatrician. · Patient's plan currently includes: Vyvanse 30 mg daily  · refills given for 3 months     · Functional improvements that justify chronic stimulant medications: Yes  · HR and BP reviewed and appropriate  · Mood managed. She does have anxiety which is managed with Lexapro  · No side effects   · In the past patient has tried: straterra (rash), adderall (jittery)     · Treatment agreement was signed by pt and myself on: 3/24/2017   ·  appropriate and last checked on: 6/27/2017   · Utox 3/24/2017, appropriate  -     lisdexamfetamine (VYVANSE) 30 mg capsule; Take 1 Cap (30 mg total) by mouth every morning. Max Daily Amount: 30 mg  -     lisdexamfetamine (VYVANSE) 30 mg capsule; Take 1 Cap (30 mg total) by mouth every morningEarliest Fill Date: 7/27/17. Max Daily Amount: 30 mg  -     lisdexamfetamine (VYVANSE) 30 mg capsule; Take 1 Cap (30 mg total) by mouth every morningEarliest Fill Date: 8/26/17. Max Daily Amount: 30 mg      Essential hypertension  Well controlled today with stress improvement. Recommend home monitoring intermittently. Prediabetes  New diagnosis. Does have h/o GDM. Counseled on lifestyle change. Plan to check again 6 months. Next visit: order A1C for December, will be due for repeat urine next visit. Counselled pt on:  Patient health concerns, plan as outlined in patient instructions and above. Patient was offered a choice/choices in the treatment plan today. Patient expresses understanding of the plan and agrees with recommendations.       Patient Instructions     TODAY, please go to:   CHECK OUT     Please schedule the following appointments at CHECK OUT:  · ADHD medication follow up with Dr. Liseth Casper in 3 months  _____________________     DADJJ'H Plan:  · Have pap smear done with OBGYN. Have them send us a record when it is done. · Good news! Your blood pressure is normal today. Check it periodically at home. Goal is less than 140/90. You have Prediabetes. This means your blood sugars are higher than normal on average. The following can help you improve and control your blood sugar. · Food Choices  · Be Mindful of CARBOHYDRATES. Carbohydrates= sugars. This means breads, pasta, rice, chips, desserts, starchy vegetables, etc.  · Decrease how OFTEN you have carbohydrates  · Decrease how MUCH carbohydrates you eat at one time  · Choose carbs that are better for you, like whole grain. · Try to eliminate sugar from your drinks. (Soda, sweet tea, lemonade, juice, gatorade, alcohol, etc). · Let Dr. Liseth Casper know if you would like to see Nutrition to discuss more  · Physical Activity  · Move more. · Try to walk 150 minutes per week. · Try to walk 10,000 steps per day  · Consider counting your steps on your smart phone. · Weight Loss  · Moving more and eating better are good for you no matter what. · If you go enough of them you can achieve a healthier weight. This will help your blood sugars. · Medication  · Sometimes you need medicine to help. Metformin can help. You would take this by mouth every day.     _____________________     Review your health maintenance below. Make plans to return and address anything that is due or will be due soon. Health Maintenance Due   Topic Date Due    Cervical Cancer Screening  12/11/2015         _____________________     Are you stressed out? Overwhelmed? In pain? Feeling disconnected? Consider MINDFULNESS. ..  [email or web address]  _____________________     If you need to get your labs done at [facility], visit this site to find a convenient location: [email or web address]      Subjective:   HPI:  Harika Karimi is a 30 y.o. female being seen for:   Chief Complaint   Patient presents with    Attention Deficit Disorder     follow up     Blood Pressure Check     follow up     Results     labs      ADHD  · Concerns since last visit: no    · Current Medications:vyvanse 30mg daily   · Going well at work at at home. · Appetite- okay  · Dizziness- no   · Insomnia- poor sleep, at baseline   · Mood lability-no    HTN  · Some improvement in stress that has helped  · Home BPs    H/o GDM  New diagnosis of preDM today. Screening and Prevention Due:  Health Maintenance Due   Topic Date Due    PAP AKA CERVICAL CYTOLOGY  12/11/2015   - will have with OBGYN in August.      Review of Systems  Otherwise as noted in HPI    Active Problem List:  Patient Active Problem List   Diagnosis Code    ADD (attention deficit disorder) F98.8    Anxiety F41.9    History of gestational diabetes Z80.34    Essential hypertension I10    Prediabetes R73.03     ? Objective:     Visit Vitals    /83 (BP 1 Location: Left arm, BP Patient Position: Sitting)    Pulse 87    Resp 16    Ht 5' 3\" (1.6 m)    Wt 156 lb (70.8 kg)    SpO2 100%    BMI 27.63 kg/m2     Wt Readings from Last 3 Encounters:   06/27/17 156 lb (70.8 kg)   03/24/17 156 lb (70.8 kg)   02/11/17 155 lb (70.3 kg)     BP Readings from Last 3 Encounters:   06/27/17 117/83   03/24/17 (!) 138/93   02/11/17 142/87     PHQ over the last two weeks 6/27/2017   Little interest or pleasure in doing things Not at all   Feeling down, depressed or hopeless Not at all   Total Score PHQ 2 0     Physical Exam   Constitutional: She appears well-developed and well-nourished. No distress. Pulmonary/Chest: Effort normal. No respiratory distress. Neurological: She is alert. Psychiatric: She has a normal mood and affect.  Her speech is normal and behavior is normal. Thought content normal.     Allergies   Allergen Reactions    Prednisone Rash    Strattera [Atomoxetine] Rash     Prior to Admission medications    Medication Sig Start Date End Date Taking? Authorizing Provider   lisdexamfetamine (VYVANSE) 30 mg capsule Take 1 Cap (30 mg total) by mouth every morning. Max Daily Amount: 30 mg 6/27/17  Yes Anny Lora. David Chawla MD   lisdexamfetamine (VYVANSE) 30 mg capsule Take 1 Cap (30 mg total) by mouth every morningEarliest Fill Date: 7/27/17. Max Daily Amount: 30 mg 7/27/17  Yes Anny Lora. David Chawla MD   lisdexamfetamine (VYVANSE) 30 mg capsule Take 1 Cap (30 mg total) by mouth every morningEarliest Fill Date: 8/26/17. Max Daily Amount: 30 mg 8/26/17  Yes Anny Lora. David Chawla MD   medroxyPROGESTERone (DEPO-PROVERA) 150 mg/mL syrg INJECT 1 ML IM Q 12 WEEKS 3/7/17  Yes Historical Provider   escitalopram oxalate (LEXAPRO) 10 mg tablet Take 1.5 Tabs by mouth daily.  1/25/17  Yes Migel Roblero MD

## 2017-06-27 NOTE — PROGRESS NOTES
Chief Complaint   Patient presents with    Attention Deficit Disorder     follow up     Blood Pressure Check     follow up     Results     labs      PILL COUNT  Today's Date: 2017  Medication name: Vyvanse   Pill strength: 30 mg   Date prescription filled: 2017  # of pills prescribed: 30  Last dose of medication taken, per patient: 17  # of pills remainin  Counted in front of patient. Medication returned to patient. 1. Have you been to the ER, urgent care clinic since your last visit? Hospitalized since your last visit? No     2. Have you seen or consulted any other health care providers outside of the [address] [place] [address] since your last visit? Include any pap smears or colon screening. No     The patient was counseled on the dangers of tobacco use, and was advised never to start. Reviewed strategies to maximize success, including never to start. Health Maintenance Due   Topic Date Due    PAP AKA CERVICAL CYTOLOGY Discussed with patient today and advised to follow up.    2015     ACP is not on file, advised to return.

## 2017-10-13 ENCOUNTER — OFFICE VISIT (OUTPATIENT)
Dept: FAMILY MEDICINE CLINIC | Age: 37
End: 2017-10-13

## 2017-10-13 VITALS
HEART RATE: 83 BPM | SYSTOLIC BLOOD PRESSURE: 127 MMHG | HEIGHT: 63 IN | DIASTOLIC BLOOD PRESSURE: 80 MMHG | OXYGEN SATURATION: 100 % | BODY MASS INDEX: 29.3 KG/M2 | WEIGHT: 165.4 LBS | TEMPERATURE: 98.3 F | RESPIRATION RATE: 18 BRPM

## 2017-10-13 DIAGNOSIS — F98.8 ATTENTION DEFICIT DISORDER, UNSPECIFIED HYPERACTIVITY PRESENCE: ICD-10-CM

## 2017-10-13 NOTE — PROGRESS NOTES
Chief Complaint   Patient presents with    Medication Evaluation     ADHD Follow-up     1. Have you been to the ER, urgent care clinic since your last visit? Hospitalized since your last visit? No    2. Have you seen or consulted any other health care providers outside of the [address] [place] [address] since your last visit? Include any pap smears or colon screening. No      [facility]  Nurse Note for Controlled Substance Refill  Chief Complaint   Patient presents with    Medication Evaluation     ADHD Follow-up      Patient requests refill of: VYVANSE    Visit Vitals    /80    Pulse 83    Temp 98.3 °F (36.8 °C) (Oral)    Resp 18    Ht 5' 3\" (1.6 m)    Wt 165 lb 6.4 oz (75 kg)    SpO2 100%    BMI 29.3 kg/m2       · Diagnosis: ADHD  · Last drug screen date: 17  · Urine provided today: yes  · Treatment agreement/Informed Consent date: 17  ·  reviewed by provider: 10/13/2017   · MME per day: 0  · Provider for today's encounter :     · PILL COUNT  Today's Date: 10/13/2017  Medication name: vyvanse  Pill strength: 30 mg  How medication is supposed to be taken: oral  How medication is being taken: oral  Date prescription filled: 17  Prescribing Provider: Namita Bernal  # of pills prescribed: 30  # of pills remainin   # pills taking per day: 1  Last dose of medication taken, per patient: Last week of Sept  Pain scale and location of pain: 0  Counted in front of patient. Bottle with contents returned to patient. [facility]  reviewed by provider. Discussed pill count with provider. Per provider, refill medication granted. Follow up as advised. Pt was made aware of provider's decision, and to return in 3 MONTHS  for an office visit, if one is not already scheduled at this time. Controlled Substance Refill sheet signed by patient and provider.   Provided with naloxone prescription, if taking benzodiazepine, prior overdose, substance abuse, or >= 120 MME per day. Continuation of treatment with controlled substance is justified by patient's functional improvements. Patient will benefit from continued prescribing.

## 2017-10-13 NOTE — PROGRESS NOTES
.. [address] [place] [address] Visit   10/13/2017  Patient ID: Gus Calderon is a 30 y.o. female. Assessment/Plan:    Diagnoses and all orders for this visit:    1. Attention deficit disorder, unspecified hyperactivity presence  -     lisdexamfetamine (VYVANSE) 30 mg capsule; Take 1 Cap (30 mg total) by mouth every morningEarliest Fill Date: 12/12/17. Max Daily Amount: 30 mg  -     lisdexamfetamine (VYVANSE) 30 mg capsule; Take 1 Cap (30 mg total) by mouth every morningEarliest Fill Date: 11/12/17. Max Daily Amount: 30 mg  -     lisdexamfetamine (VYVANSE) 30 mg capsule; Take 1 Cap (30 mg total) by mouth every morning. Max Daily Amount: 30 mg  -     COMPLIANCE DRUG SCREEN/PRESCRIPTION MONITORING    ADD is adequately controlled with current plan     · Diagnosed in 9th grade by Dr. Jaclyn James, pediatrician. · Patient's plan currently includes: Vyvanse 30 mg daily  · refills given for 3 months     · Functional improvements that justify chronic stimulant medications: Yes  · HR and BP reviewed and appropriate  · Mood managed. She does have anxiety which is managed with Lexapro  · No side effects   · In the past patient has tried: straterra (rash), adderall (jittery)     · Treatment agreement was signed by pt and myself on: 3/24/2017   ·  appropriate and last checked on: 6/27/2017   · Utox 3/24/2017, appropriate. REPEAT TODAY      Counselled pt on Patient health concerns and plans. Patient was offered a choice/choices in the treatment plan today. Reviewed return precautions as appropriate. Patient expresses understanding of the plan and agrees with recommendations. See patient instructions for more.     Next visit: medication refill    Patient Instructions   TODAY, please go to:     CHECK OUT        Please schedule the following appointments at CHECK OUT:  · Medication refill with Dr. Chelsea Lentz in 3 months    · Complete Physical Exam and lab follow up with Dr. Chelsea Lentz in about 5 months  · Lab visit, fasting the week before our visit. Subjective:   HPI:  Quin Jose is a 30 y.o. female being seen for:   Chief Complaint   Patient presents with    Medication Evaluation     ADHD Follow-up   ADHD  · Concerns since last visit: none  · Doing well  · No medication for at least week  · Current Medications: vyvanse      Screening and Prevention Due:  Health Maintenance Due   Topic Date Due    PAP AKA CERVICAL CYTOLOGY  12/11/2015    INFLUENZA AGE 9 TO ADULT  08/01/2017   had flu shot at work     Review of Systems  Otherwise as noted in HPI  ? Objective:     Visit Vitals    /80    Pulse 83    Temp 98.3 °F (36.8 °C) (Oral)    Resp 18    Ht 5' 3\" (1.6 m)    Wt 165 lb 6.4 oz (75 kg)    SpO2 100%    BMI 29.3 kg/m2     Wt Readings from Last 3 Encounters:   10/13/17 165 lb 6.4 oz (75 kg)   06/27/17 156 lb (70.8 kg)   03/24/17 156 lb (70.8 kg)     BP Readings from Last 3 Encounters:   10/13/17 127/80   06/27/17 117/83   03/24/17 (!) 138/93     PHQ over the last two weeks 6/27/2017   Little interest or pleasure in doing things Not at all   Feeling down, depressed or hopeless Not at all   Total Score PHQ 2 0     Physical Exam   Constitutional: She appears well-developed and well-nourished. No distress. Pulmonary/Chest: Effort normal. No respiratory distress. Neurological: She is alert. Psychiatric: She has a normal mood and affect. Her behavior is normal.     Allergies   Allergen Reactions    Prednisone Rash    Strattera [Atomoxetine] Rash     Prior to Admission medications    Medication Sig Start Date End Date Taking? Authorizing Provider   lisdexamfetamine (VYVANSE) 30 mg capsule Take 1 Cap (30 mg total) by mouth every morningEarliest Fill Date: 12/12/17. Max Daily Amount: 30 mg 12/12/17  Yes [facility]. Esperanza Ford MD   lisdexamfetamine (VYVANSE) 30 mg capsule Take 1 Cap (30 mg total) by mouth every morningEarliest Fill Date: 11/12/17. Max Daily Amount: 30 mg 11/12/17  Yes [facility].  Esperanza Ford MD lisdexamfetamine (VYVANSE) 30 mg capsule Take 1 Cap (30 mg total) by mouth every morning. Max Daily Amount: 30 mg 10/13/17  Yes Sabra Bass. Unique Palmer MD   medroxyPROGESTERone (DEPO-PROVERA) 150 mg/mL syrg INJECT 1 ML IM Q 12 WEEKS 3/7/17  Yes Historical Provider   escitalopram oxalate (LEXAPRO) 10 mg tablet Take 1.5 Tabs by mouth daily.  1/25/17   Fatmata Sherwood MD     Patient Active Problem List   Diagnosis Code    ADD (attention deficit disorder) F98.8    Anxiety F41.9    History of gestational diabetes Z80.34    Essential hypertension I10    Prediabetes R73.03

## 2017-10-13 NOTE — MR AVS SNAPSHOT
Visit Information Date & Time Provider Department Dept. Phone Encounter #  
 10/13/2017  2:20 PM Clorinda Jose Alfredo. Chauncey Curtis MD [facility] [phone] [id number] Upcoming Health Maintenance Date Due  
 PAP AKA CERVICAL CYTOLOGY 12/11/2015 INFLUENZA AGE 9 TO ADULT 8/1/2017 DTaP/Tdap/Td series (2 - Td) 6/1/2019 Allergies as of 10/13/2017  Review Complete On: 6/27/2017 By: Benito Ruiz LPN Severity Noted Reaction Type Reactions Prednisone  02/05/2010    Rash Strattera [Atomoxetine]  02/05/2010    Rash Current Immunizations  Reviewed on 9/26/2014 Name Date H1N1 FLU VACCINE 11/1/2009 Human Papillomavirus 6/1/2005, 2/1/2005, 1/1/2005 Influenza Vaccine 9/26/2014 Influenza Vaccine Nasal 11/1/2009 Influenza Vaccine Split 10/5/2010 Rho(D) Immune Globulin - IM 8/10/2016  6:24 PM  
 TDAP Vaccine 6/1/2009 Not reviewed this visit Vitals BP Pulse Temp Resp Height(growth percentile) Weight(growth percentile) 127/80 83 98.3 °F (36.8 °C) (Oral) 18 5' 3\" (1.6 m) 165 lb 6.4 oz (75 kg) SpO2 BMI OB Status Smoking Status 100% 29.3 kg/m2 Injection Never Smoker Vitals History BMI and BSA Data Body Mass Index Body Surface Area  
 29.3 kg/m 2 1.83 m 2 Preferred Pharmacy Pharmacy Name Phone [facility] [address] AT [address] [place] [address] Drive [phone] Your Updated Medication List  
  
   
This list is accurate as of: 10/13/17  3:14 PM.  Always use your most recent med list.  
  
  
  
  
 escitalopram oxalate 10 mg tablet Commonly known as:  Duwaine Needs Take 1.5 Tabs by mouth daily. * lisdexamfetamine 30 mg capsule Commonly known as:  VYVANSE Take 1 Cap (30 mg total) by mouth every morning. Max Daily Amount: 30 mg  
  
 * lisdexamfetamine 30 mg capsule Commonly known as:  VYVANSE  
 Take 1 Cap (30 mg total) by mouth every morningEarliest Fill Date: 7/27/17. Max Daily Amount: 30 mg  
  
 * lisdexamfetamine 30 mg capsule Commonly known as:  VYVANSE Take 1 Cap (30 mg total) by mouth every morningEarliest Fill Date: 8/26/17. Max Daily Amount: 30 mg  
  
 medroxyPROGESTERone 150 mg/mL Syrg Commonly known as:  DEPO-PROVERA INJECT 1 ML IM Q 12 WEEKS * Notice: This list has 3 medication(s) that are the same as other medications prescribed for you. Read the directions carefully, and ask your doctor or other care provider to review them with you. Patient Instructions TODAY, please go to: CHECK OUT Please schedule the following appointments at CHECK OUT: 
· Medication refill with Dr. Eliot Pendleton in 3 months · Complete Physical Exam and lab follow up with Dr. Eliot Pendleton in about 5 months · Lab visit, fasting the week before our visit. Introducing [facility]! Dear Scott Fermin: Thank you for requesting a [employer] account. Our records indicate that you already have an active [employer] account. You can access your account anytime at [email or web address] Did you know that you can access your hospital and ER discharge instructions at any time in [employer]? You can also review all of your test results from your hospital stay or ER visit. Additional Information If you have questions, please visit the Frequently Asked Questions section of the [employer] website at [email or web address] Remember, [employer] is NOT to be used for urgent needs. For medical emergencies, dial 911. Now available from your iPhone and Android! Please provide this summary of care documentation to your next provider. Your primary care clinician is listed as Rosa Ford. If you have any questions after today's visit, please call [phone].

## 2017-10-23 LAB — DRUGS UR: NORMAL

## 2019-05-04 NOTE — PATIENT INSTRUCTIONS
TODAY, please go to:     CHECK OUT        Please schedule the following appointments at CHECK OUT:  · Medication refill with Dr. Kandace John in 3 months    · Complete Physical Exam and lab follow up with Dr. Kandace John in about 5 months  · Lab visit, fasting the week before our visit. foot pain/injury

## 2020-01-18 ENCOUNTER — OFFICE VISIT (OUTPATIENT)
Dept: URGENT CARE | Age: 40
End: 2020-01-18

## 2020-01-18 VITALS
HEART RATE: 94 BPM | RESPIRATION RATE: 18 BRPM | BODY MASS INDEX: 26.93 KG/M2 | OXYGEN SATURATION: 98 % | TEMPERATURE: 98.6 F | DIASTOLIC BLOOD PRESSURE: 78 MMHG | WEIGHT: 152 LBS | SYSTOLIC BLOOD PRESSURE: 130 MMHG | HEIGHT: 63 IN

## 2020-01-18 DIAGNOSIS — M79.671 PAIN OF RIGHT HEEL: Primary | ICD-10-CM

## 2020-01-18 DIAGNOSIS — L03.115 CELLULITIS OF RIGHT LOWER EXTREMITY: ICD-10-CM

## 2020-01-18 RX ORDER — IBUPROFEN 600 MG/1
600 TABLET ORAL
Qty: 30 TAB | Refills: 0 | Status: SHIPPED | OUTPATIENT
Start: 2020-01-18 | End: 2022-05-04 | Stop reason: DRUGHIGH

## 2020-01-18 RX ORDER — NORETHINDRONE ACETATE AND ETHINYL ESTRADIOL 1MG-20(21)
KIT ORAL
COMMUNITY
End: 2022-05-04

## 2020-01-18 RX ORDER — CEPHALEXIN 500 MG/1
500 CAPSULE ORAL 4 TIMES DAILY
Qty: 28 CAP | Refills: 0 | Status: SHIPPED | OUTPATIENT
Start: 2020-01-18 | End: 2020-01-25

## 2020-01-18 NOTE — PROGRESS NOTES
Woke Thurs AM with pain in her rt heel and it has increased and gotten progressively more red, warm and tender. No injury and no history of anything like this. Tried ice without relief and Motrin an hour ago with some relief    The history is provided by the patient. Foot Pain   This is a new problem. The current episode started more than 2 days ago. The problem occurs constantly. The problem has been gradually worsening. Pertinent negatives include no chest pain, no abdominal pain, no headaches and no shortness of breath. The symptoms are aggravated by standing and walking (palpation). Nothing relieves the symptoms. She has tried rest and a cold compress (advil) for the symptoms. The treatment provided mild relief. Past Medical History:   Diagnosis Date    ADD (attention deficit disorder)     diagnosed in 9th grade by Dr. Julián Hurt, pediatrician.  on medication int since then    Anemia     Anxiety     Back pain     intermittent    Essential hypertension 3/24/2017    History of Clostridium difficile 2016    History of gestational diabetes 2016    gestational diabetes- on glyburide    Prediabetes 2017    Pyelonephritis 2011    by [facility] [id number]        Past Surgical History:   Procedure Laterality Date     DELIVERY ONLY      2016         Family History   Problem Relation Age of Onset    Liver Disease Mother         cirrhosis    Hypertension Mother     Alcohol abuse Mother     Hypertension Father     Diabetes Father         on insulin    Cancer Father         lung    Alcohol abuse Father     Alcohol abuse Sister     Anxiety Sister     Depression Sister     Alcohol abuse Brother     Anxiety Brother         on lexapro    Hypertension Maternal Grandmother     Stroke Maternal Grandmother 73        aneurysm     Hypertension Paternal Grandmother         Social History     Socioeconomic History    Marital status: SINGLE     Spouse name: Not on file    Number of children: 2    Years of education: Not on file    Highest education level: Not on file   Occupational History    Occupation: Pediatric      Employer: [employer] Financial resource strain: Not on file    Food insecurity:     Worry: Not on file     Inability: Not on file    Transportation needs:     Medical: Not on file     Non-medical: Not on file   Tobacco Use    Smoking status: Never Smoker    Smokeless tobacco: Never Used   Substance and Sexual Activity    Alcohol use: No     Comment: 4 drinks max at a time, may drink once a month     Drug use: No    Sexual activity: Yes     Partners: Male     Birth control/protection: Injection     Comment: monogamous with bf since about 2001   Lifestyle    Physical activity:     Days per week: Not on file     Minutes per session: Not on file    Stress: Not on file   Relationships    Social connections:     Talks on phone: Not on file     Gets together: Not on file     Attends Ep[employer]opalian service: Not on file     Active member of club or organization: Not on file     Attends meetings of clubs or organizations: Not on file     Relationship status: Not on file    Intimate partner violence:     Fear of current or ex partner: Not on file     Emotionally abused: Not on file     Physically abused: Not on file     Forced sexual activity: Not on file   Other Topics Concern     Service No    Blood Transfusions No    Caffeine Concern Yes     Comment: 3-4 cups coffee, 3-4 sodas daily    Occupational Exposure No    Hobby Hazards No    Sleep Concern No     Comment: 7-8 hours daily    Stress Concern No    Weight Concern No    Special Diet No    Back Care No    Exercise No     Comment: doesn't exercise    Bike Helmet Not Asked    Seat Belt Not Asked    Self-Exams Not Asked   Social History Narrative    Works clerical for [employer]    Graduated from [address] ALLERGIES: Prednisone and Strattera [atomoxetine]    Review of Systems   Constitutional: Negative. Negative for chills and fever. Respiratory: Negative for shortness of breath. Cardiovascular: Negative for chest pain. Gastrointestinal: Negative for abdominal pain. Genitourinary:        Pregnancy test offered and pt declined     Musculoskeletal: Positive for arthralgias (rt heel ) and gait problem (limp on rt). Negative for joint swelling. Skin: Positive for color change. Negative for rash and wound. Neurological: Negative for weakness, numbness and headaches. Vitals:    01/18/20 1819   BP: (!) 167/91   Pulse: 100   Resp: 18   Temp: 98.6 °F (37 °C)   SpO2: 98%   Weight: 152 lb (68.9 kg)   Height: 5' 3\" (1.6 m)       Physical Exam  Vitals signs and nursing note reviewed. Constitutional:       General: She is not in acute distress. Appearance: Normal appearance. She is normal weight. She is not ill-appearing. HENT:      Mouth/Throat:      Mouth: Mucous membranes are moist.   Eyes:      Conjunctiva/sclera: Conjunctivae normal.   Cardiovascular:      Rate and Rhythm: Normal rate and regular rhythm. Pulses: Normal pulses. Comments: HR 90  Musculoskeletal:         General: Swelling and tenderness present. No deformity or signs of injury. Right lower leg: No edema. Left lower leg: No edema. Right foot: Normal range of motion. No deformity. Left foot: Normal range of motion. No deformity. Feet:    Feet:      Right foot:      Skin integrity: Erythema, warmth (mild local swelling no lymphatic streaking. ), callus (dry heels with minimal callus present) and dry skin present. No ulcer, blister, skin breakdown or fissure. Left foot:      Skin integrity: Callus ( dry heels with minimal callus present) and dry skin present. No ulcer, blister, skin breakdown, erythema, warmth or fissure. Comments:  Ankle NT, Alena's tender at distal insertion site without step offs or signs of trauma. Calf NT and no pain with calf flexion. Foot NT except for heel as detailed FROM NVI distally. Mild local swelling, blanchable erythema, local warmth  Skin:     General: Skin is warm. Capillary Refill: Capillary refill takes less than 2 seconds. Findings: Erythema present. Neurological:      General: No focal deficit present. Mental Status: She is alert. Sensory: No sensory deficit. Psychiatric:         Mood and Affect: Mood normal.         Behavior: Behavior normal.         Thought Content: Thought content normal.         MDM      ICD-10-CM ICD-9-CM    1. Pain of right heel M79.671 729.5 XR CALCANEUS RT      CRUTCHES   2. Cellulitis of right lower extremity L03.115 682.6     heel     Medications Ordered Today   Medications    cephALEXin (KEFLEX) 500 mg capsule     Sig: Take 1 Cap by mouth four (4) times daily for 7 days. Dispense:  28 Cap     Refill:  0    ibuprofen (MOTRIN) 600 mg tablet     Sig: Take 1 Tab by mouth every six (6) hours as needed for Pain. Dispense:  30 Tab     Refill:  0     The patients condition was discussed with the patient and they understand. The patient is to follow up with primary care doctor ,If signs and symptoms become worse the pt is to go to the ER. The patient is to take medications as prescribed.                Procedures

## 2020-01-19 NOTE — PATIENT INSTRUCTIONS
Rest and seek medical care for increased problems, any questions or concern including but not limited to the ones discussed with you here today. Watch for signs of increased inflammationinfection such as increased pain, redness, fever, red streaking, any numbness or persistent or increased  Swelling. Follow up with your doctor in 2-3 days for follow up Cellulitis: Care Instructions Your Care Instructions Cellulitis is a skin infection caused by bacteria, most often strep or staph. It often occurs after a break in the skin from a scrape, cut, bite, or puncture, or after a rash. Cellulitis may be treated without doing tests to find out what caused it. But your doctor may do tests, if needed, to look for a specific bacteria, like methicillin-resistant Staphylococcus aureus (MRSA). The doctor has checked you carefully, but problems can develop later. If you notice any problems or new symptoms, get medical treatment right away. Follow-up care is a key part of your treatment and safety. Be sure to make and go to all appointments, and call your doctor if you are having problems. It's also a good idea to know your test results and keep a list of the medicines you take. How can you care for yourself at home? · Take your antibiotics as directed. Do not stop taking them just because you feel better. You need to take the full course of antibiotics. · Prop up the infected area on pillows to reduce pain and swelling. Try to keep the area above the level of your heart as often as you can. · If your doctor told you how to care for your wound, follow your doctor's instructions. If you did not get instructions, follow this general advice: 
? Wash the wound with clean water 2 times a day. Don't use hydrogen peroxide or alcohol, which can slow healing. ? You may cover the wound with a thin layer of petroleum jelly, such as Vaseline, and a nonstick bandage. ?  Apply more petroleum jelly and replace the bandage as needed. · Be safe with medicines. Take pain medicines exactly as directed. ? If the doctor gave you a prescription medicine for pain, take it as prescribed. ? If you are not taking a prescription pain medicine, ask your doctor if you can take an over-the-counter medicine. To prevent cellulitis in the future · Try to prevent cuts, scrapes, or other injuries to your skin. Cellulitis most often occurs where there is a break in the skin. · If you get a scrape, cut, mild burn, or bite, wash the wound with clean water as soon as you can to help avoid infection. Don't use hydrogen peroxide or alcohol, which can slow healing. · If you have swelling in your legs (edema), support stockings and good skin care may help prevent leg sores and cellulitis. · Take care of your feet, especially if you have diabetes or other conditions that increase the risk of infection. Wear shoes and socks. Do not go barefoot. If you have athlete's foot or other skin problems on your feet, talk to your doctor about how to treat them. When should you call for help? Call your doctor now or seek immediate medical care if: 
  · You have signs that your infection is getting worse, such as: 
? Increased pain, swelling, warmth, or redness. ? Red streaks leading from the area. ? Pus draining from the area. ? A fever.  
  · You get a rash.  
 Watch closely for changes in your health, and be sure to contact your doctor if: 
  · You do not get better as expected. Where can you learn more? Go to http://pepe-jazzy.info/. Torrey Sutherland in the search box to learn more about \"Cellulitis: Care Instructions. \" Current as of: April 1, 2019 Content Version: 12.2 © 8235-1308 [employer]. Care instructions adapted under license by [employer] (which disclaims liability or warranty for this information).  If you have questions about a medical condition or this instruction, always ask your healthcare professional. [address] any warranty or liability for your use of this information.

## 2020-02-10 ENCOUNTER — HOSPITAL ENCOUNTER (OUTPATIENT)
Dept: MAMMOGRAPHY | Age: 40
Discharge: HOME | End: 2020-02-10
Attending: NURSE PRACTITIONER
Payer: MEDICAID

## 2020-02-10 DIAGNOSIS — Z12.31 VISIT FOR SCREENING MAMMOGRAM: ICD-10-CM

## 2020-02-10 PROCEDURE — 77063 BREAST TOMOSYNTHESIS BI: CPT

## 2020-07-30 ENCOUNTER — OFFICE VISIT (OUTPATIENT)
Dept: URGENT CARE | Age: 40
End: 2020-07-30

## 2020-07-30 VITALS — RESPIRATION RATE: 16 BRPM | OXYGEN SATURATION: 97 % | TEMPERATURE: 98.5 F | HEART RATE: 85 BPM

## 2020-07-30 DIAGNOSIS — R50.9 FEVER, UNSPECIFIED FEVER CAUSE: ICD-10-CM

## 2020-07-30 DIAGNOSIS — R52 GENERALIZED BODY ACHES: ICD-10-CM

## 2020-07-30 DIAGNOSIS — Z11.59 SCREENING FOR VIRAL DISEASE: ICD-10-CM

## 2020-07-30 DIAGNOSIS — R06.02 SOB (SHORTNESS OF BREATH): Primary | ICD-10-CM

## 2020-07-30 NOTE — LETTER
FLU CLINIC [facility] 150 
FLU CLINIC [facility] [address] [facility]RG[facility]ARE 
[address] [facility] [address] 
[phone] Work/School Note Date: 7/30/2020 To Whom It May concern: 
 
Alin Stout was seen and treated today in the urgent care center. Abraham Carballo is to quarantine and await for results. Sincerely, Neri Arriaza MD

## 2020-07-30 NOTE — PROGRESS NOTES
This patient was seen in Flu Clinic at [address] [place] [address] Urgent Care while in their vehicle due to COVID-19 pandemic with PPE and focused examination in order to decrease community viral transmission. The patient/guardian gave verbal consent to treat. Markel Adams is a 47 y.o. female who presents with fever, chills, body aches, and SOB x 3 days. Tmax 102, has been taking tylenol. 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 Denies cough. . PMH: HTN, preDM. Non-smoker. The history is provided by the patient. Past Medical History:   Diagnosis Date    ADD (attention deficit disorder)     diagnosed in 9th grade by Dr. Germania Roberson, pediatrician.  on medication int since then    Anemia     Anxiety     Back pain     intermittent    Essential hypertension 3/24/2017    History of Clostridium difficile 2016    History of gestational diabetes 2016    gestational diabetes- on glyburide    Prediabetes 2017    [facility]    Varicella [id number]        Past Surgical History:   Procedure Laterality Date     DELIVERY ONLY      2016         Family History   Problem Relation Age of Onset    Liver Disease Mother         cirrhosis    Hypertension Mother     Alcohol abuse Mother     Hypertension Father     Diabetes Father         on insulin    Cancer Father         lung    Alcohol abuse Father     Alcohol abuse Sister     Anxiety Sister     Depression Sister     Alcohol abuse Brother     Anxiety Brother         on lexapro    Hypertension Maternal Grandmother     Stroke Maternal Grandmother 73        aneurysm     Hypertension Paternal Grandmother         Social History     Socioeconomic History    Marital status: SINGLE     Spouse name: Not on file    Number of children: 2    Years of education: Not on file    Highest education level: Not on file   Occupational History    Occupation: Pediatric  Employer: [employer]   Social Needs    Financial resource strain: Not on file    Food insecurity     Worry: Not on file     Inability: Not on file    Transportation needs     Medical: Not on file     Non-medical: Not on file   Tobacco Use    Smoking status: Never Smoker    Smokeless tobacco: Never Used   Substance and Sexual Activity    Alcohol use: No     Comment: 4 drinks max at a time, may drink once a month     Drug use: No    Sexual activity: Yes     Partners: Male     Birth control/protection: Injection     Comment: monogamous with bf since about 2001   Lifestyle    Physical activity     Days per week: Not on file     Minutes per session: Not on file    Stress: Not on file   Relationships    Social connections     Talks on phone: Not on file     Gets together: Not on file     Attends Orthodox service: Not on file     Active member of club or organization: Not on file     Attends meetings of clubs or organizations: Not on file     Relationship status: Not on file    Intimate partner violence     Fear of current or ex partner: Not on file     Emotionally abused: Not on file     Physically abused: Not on file     Forced sexual activity: Not on file   Other Topics Concern     Service No    Blood Transfusions No    Caffeine Concern Yes     Comment: 3-4 cups coffee, 3-4 sodas daily    Occupational Exposure No    Hobby Hazards No    Sleep Concern No     Comment: 7-8 hours daily    Stress Concern No    Weight Concern No    Special Diet No    Back Care No    Exercise No     Comment: doesn't exercise    Bike Helmet Not Asked   [address] [facility] Not Asked    Self-Exams Not Asked   Social History Narrative    Works clerical for [employer]    Graduated from [employer]: Prednisone and Strattera [atomoxetine]    Review of Systems   Constitutional: Positive for chills and fever. Negative for activity change and appetite change.    HENT: Positive for sore throat (slight). Negative for congestion and rhinorrhea. Respiratory: Positive for shortness of breath. Negative for cough and wheezing. Cardiovascular: Negative for chest pain. Gastrointestinal: Negative for abdominal pain, diarrhea, nausea and vomiting. Musculoskeletal: Positive for myalgias. Neurological: Negative for headaches. Vitals:    07/30/20 0851 07/30/20 0859   Pulse: 90 85   Resp: 16    Temp: 98.5 °F (36.9 °C)    SpO2: 95% 97%       Physical Exam  Vitals signs and nursing note reviewed. Constitutional:       General: She is not in acute distress. Appearance: She is well-developed. She is not diaphoretic. HENT:      Nose: No congestion or rhinorrhea. Mouth/Throat:      Mouth: Mucous membranes are moist.      Pharynx: Oropharynx is clear. No pharyngeal swelling or posterior oropharyngeal erythema. Tonsils: No tonsillar exudate. Pulmonary:      Effort: Pulmonary effort is normal. No respiratory distress. Breath sounds: Normal breath sounds. No stridor. No wheezing, rhonchi or rales. Neurological:      Mental Status: She is alert. Psychiatric:         Behavior: Behavior normal.         Thought Content: Thought content normal.         Judgment: Judgment normal.         [facility]    ICD-10-CM ICD-9-CM   1. SOB (shortness of breath)  R06.02 786.05   2. Fever, unspecified fever cause  R50.9 780.60   3. Generalized body aches  R52 780.96   4.  Screening for viral disease  Z11.59 V73.99       Orders Placed This Encounter    NOVEL CORONAVIRUS (COVID-19)     Scheduling Instructions:      1) Due to current limited availability of the COVID-19 PCR test, tests will be prioritized and may not be completed.              2) Order only if the test result will change clinical management or necessary for a return to mission-critical employment decision.              3) Print and instruct patient to adhere to [facility] home isolation program. (Link Above)              4) Set up or refer patient for a monitoring program.              5) Have patient sign up for and leverage MyChart (if not previously done). Order Specific Question:   Status     Answer:   Symptomatic/Infection Suspected        Self Quarantine  Deep breathing exercises  Tylenol prn  Increase fluids    If signs and symptoms become worse the pt is to go to the ER.          Procedures

## 2020-08-02 LAB — SARS-COV-2, NAA: NOT DETECTED

## 2020-09-03 ENCOUNTER — VIRTUAL VISIT (OUTPATIENT)
Dept: FAMILY MEDICINE CLINIC | Age: 40
End: 2020-09-03
Payer: MEDICAID

## 2020-09-03 DIAGNOSIS — Z76.89 ENCOUNTER TO ESTABLISH CARE: Primary | ICD-10-CM

## 2020-09-03 DIAGNOSIS — F98.8 ATTENTION DEFICIT DISORDER, UNSPECIFIED HYPERACTIVITY PRESENCE: ICD-10-CM

## 2020-09-03 PROCEDURE — 99213 OFFICE O/P EST LOW 20 MIN: CPT | Performed by: NURSE PRACTITIONER

## 2020-09-03 NOTE — PROGRESS NOTES
S: Bacilio Riley is a 47 y.o. WHITE OR  female who presents for establish care    Assessment/Plan: Virtual Visit    1. Attention deficit disorder, unspecified hyperactivity presence  -no currently on meds, in past, Vyvanse 30mg  -will prescribe vyvanse 30mg #30, pt to make in person visit for CS and UDS    11-20 minutes were spent on the digital evaluation and management of this patient. RTC 1 month for ADD/CS/UDS     HPI:    Establish care  ADD  Current therapy:  vyvanse 30mg   No palpitations  Pregnant and went off (had daughter 4 yrs ago)  Would like to get back on med    Social history:   Nutrition: eating healthy   Physical: walk daily at lunch   Social: dad, has 4yo daughter will go to pre- (25 yo son, 15 yo son), 2 dogs    Occupation: peds office - full time     Social History     Tobacco Use   Smoking Status Never Smoker   Smokeless Tobacco Never Used     Social History     Substance and Sexual Activity   Alcohol Use No    Comment: 4 drinks max at a time, may drink once a month      Social History     Substance and Sexual Activity   Drug Use No     Social History     Substance and Sexual Activity   Sexual Activity Yes    Partners: Male    Birth control/protection: Injection    Comment: monogamous with bf since about 2001       Review of Systems:  - Constitutional Symptoms: no fevers/chills  - Cardiovascular: no chest pain or palpitations  - Respiratory: no cough or shortness of breath  ROS is negative otherwise. I reviewed the following:  Past Medical History:   Diagnosis Date    ADD (attention deficit disorder)     diagnosed in 9th grade by Dr. Francisco Neely, pediatrician.  on medication int since then    Anemia     Anxiety     Back pain     intermittent    Essential hypertension 3/24/2017    History of Clostridium difficile 08/2016    History of gestational diabetes 2016    gestational diabetes- on glyburide    Prediabetes 6/27/2017    [facility] 12[facility]2011    [facility] [id number]       Current Outpatient Medications   Medication Sig Dispense Refill    norethindrone-ethinyl estradiol (LOESTRIN FE 1/20, 28-DAY,) 1 mg-20 mcg (21)/75 mg (7) tab Loestrin Fe 1/20 (28-Day) 1 mg-20 mcg (21)/75 mg (7) tablet   Take 1 tablet every day by oral route.  ibuprofen (MOTRIN) 600 mg tablet Take 1 Tab by mouth every six (6) hours as needed for Pain. 30 Tab 0       Allergies   Allergen Reactions    Prednisone Rash    Strattera [Atomoxetine] Rash         GENERAL: Mara Seymour is in no acute distress. Non-toxic. Well nourished. Well developed. Appropriately groomed. PSYCH: appropriate behavior, dress and thought processes. Good eye contact. Clear and coherent speech. Full affect. Good insight.   ______________________________________________________________________  Patient education was done. Advised on nutrition, physical activity, weight management, tobacco, alcohol and safety. Counseling included discussion of diagnosis, differentials, treatment options, prescribed treatment, warning signs and follow up. Medication risks/benefits,interactions and alternatives discussed with patient.      Patient verbalized understanding and agreed to plan of care. Patient was given an after visit summary which included current diagnoses, medications and vital signs. Follow up as directed. Mara Seymour, who was evaluated through a patient-initiated, synchronous (real-time) audio-video encounter, and/or her healthcare decision maker, is aware that it is a billable service, with coverage as determined by her insurance carrier. She provided verbal consent to proceed: Yes, and patient identification was verified. It was conducted pursuant to the emergency declaration under the [address] [place] [address], [address] [place] [address] authority and the [employer] Act. A caregiver was present when appropriate.  Ability to conduct physical exam was limited. I was in the office. The patient was in the office.       Janet Rater, NP

## 2020-09-03 NOTE — PATIENT INSTRUCTIONS
1) Refill for 30 days on vyvanse. You will need to make an in person visit to complete a controlled substance agreement and urine drug screen within the next 30 days Learning About Attention Deficit Hyperactivity Disorder (ADHD) in Adults What is ADHD? Attention deficit hyperactivity disorder (ADHD) is a condition in which people have a hard time paying attention. Adults with ADHD also may be more active than normal. They tend to act without thinking. ADHD may make it harder for them to focus, get organized, and finish tasks. ADHD most often starts in childhood and lasts into adulthood. Many adults don't know that they have ADHD until their children are diagnosed. Then they begin to see their own symptoms. Doctors don't know what causes ADHD. But it tends to run in families. What are the symptoms? The most common types of ADHD symptoms in adults are attention problems and hyperactivity. Attention problems Adults with ADHD often find it hard to: · Finish tasks that don't interest them or aren't easy. But they may become obsessed with activities that they find interesting and enjoy. · Keep relationships. · Focus their attention on conversations, reading materials, or jobs. They may change jobs a lot. · Remember things. They may misplace or lose things. · Pay attention. They are easily distracted. They find it hard to focus on one task. · Think before they act. They may make quick decisions. They may act before they think about the effect of their actions. Hyperactivity Adults with ADHD may: · Fidget. They may swing their legs, shift in their seats, or tap their fingers. · Move around a lot. They may feel \"revved up\" or on the go. They may not be able to slow down until they are very tired. · Find it hard to relax. They may feel restless and find it hard to do quiet things like read or watch TV. How does ADHD affect daily life? ADHD in adults may affect: · Job performance.  They may find it hard to organize their work, manage their time, and focus on one task at a time. They may forget, misplace, or lose things. They may quit their jobs out of boredom. · Relationships. Adults with ADHD may find it hard to focus their attention on conversations. It is hard for them to \"read\" the behavior and moods of others and express their own feelings. · Temper. They may get easily frustrated. This often can make it harder for them to deal with stress. These adults may overreact and have a short, quick temper. · The ability to solve problems. Adults who have a hard time waiting for things they want may act before they think about the effect of their actions. They may take part in risky behaviors. These include unprotected sex, unsafe driving, alcohol and drug use, or unwise business ventures. How is ADHD treated? ADHD can be treated with medicines, behavior training, or counseling. Or it may be a combination of these treatments. Medicines Stimulant medicines are most often used to treat ADHD. These may include: · Amphetamines (such as Adderall and Dexedrine). · Methylphenidate (such as Concerta, Daytrana, Focalin, Metadate, and Ritalin). Other medicines that may be used are: · Atomoxetine, such as Strattera, a nonstimulant medicine for ADHD. · Antihypertensives. These include clonidine (such as Catapres) and guanfacine (such as Tenex). · Antidepressants, which include bupropion (Wellbutrin). Behavior training Behavior training can help adults with ADHD learn how to: · Get organized. A daily organizer or planner can help these adults organize their daily tasks. They can write down appointments and other things they need to remember. · Decrease distractions. They can set up their work or home environment so that there are fewer things that will distract them. They may find using headphones or a \"white noise\" machine helpful. College students can arrange a quiet living situation.  They may need a single dorm room. · Work on relationships. Social skills training can help adults with ADHD relate to family, friends, and coworkers. Couples counseling or family therapy can also help improve relationships. Counseling Counseling is not meant to treat inattention, hyperactivity, or impulsiveness. But it can help with some of the problems that go along with ADHD. These include not getting along well with others and having problems following rules. Where can you learn more? Go to http://pepe-jazzy.info/ Enter X282 in the search box to learn more about \"Learning About Attention Deficit Hyperactivity Disorder (ADHD) in Adults. \" Current as of: January 31, 2020               Content Version: 12.5 © 8247-8158 [employer]. Care instructions adapted under license by [employer] (which disclaims liability or warranty for this information). If you have questions about a medical condition or this instruction, always ask your healthcare professional. [place] [address] any warranty or liability for your use of this information.

## 2020-09-03 NOTE — PROGRESS NOTES
Nika Hernandez is a 47 y.o. female    HIPAA verified by two patient identifiers. Chief Complaint   Patient presents with    Medication Check     Vyvanse     Establish Care     1. Have you been to the ER, urgent care clinic since your last visit? Hospitalized since your last visit? No    2. Have you seen or consulted any other health care providers outside of the [address] [place] [address] since your last visit? Include any pap smears or colon screening.  No    Patient-Reported Vitals 9/3/2020   Patient-Reported Weight 155       Pain Scale: /10  Pain Location:      DOXY: [phone]

## 2022-03-19 PROBLEM — I10 ESSENTIAL HYPERTENSION: Status: ACTIVE | Noted: 2017-03-24

## 2022-03-19 PROBLEM — Z86.32 HISTORY OF GESTATIONAL DIABETES: Status: ACTIVE | Noted: 2017-01-25

## 2022-03-20 PROBLEM — R73.03 PREDIABETES: Status: ACTIVE | Noted: 2017-06-27

## 2022-05-04 ENCOUNTER — OFFICE VISIT (OUTPATIENT)
Dept: INTERNAL MEDICINE CLINIC | Age: 42
End: 2022-05-04
Payer: MEDICAID

## 2022-05-04 VITALS
SYSTOLIC BLOOD PRESSURE: 170 MMHG | WEIGHT: 168.6 LBS | HEART RATE: 106 BPM | HEIGHT: 63 IN | TEMPERATURE: 98.7 F | BODY MASS INDEX: 29.88 KG/M2 | OXYGEN SATURATION: 100 % | DIASTOLIC BLOOD PRESSURE: 112 MMHG | RESPIRATION RATE: 16 BRPM

## 2022-05-04 DIAGNOSIS — Z86.32 HISTORY OF GESTATIONAL DIABETES: ICD-10-CM

## 2022-05-04 DIAGNOSIS — Z00.00 PHYSICAL EXAM, ANNUAL: Primary | ICD-10-CM

## 2022-05-04 DIAGNOSIS — G56.03 BILATERAL CARPAL TUNNEL SYNDROME: ICD-10-CM

## 2022-05-04 DIAGNOSIS — F43.21 GRIEF: ICD-10-CM

## 2022-05-04 DIAGNOSIS — F90.2 ATTENTION DEFICIT HYPERACTIVITY DISORDER (ADHD), COMBINED TYPE: ICD-10-CM

## 2022-05-04 DIAGNOSIS — I10 PRIMARY HYPERTENSION: ICD-10-CM

## 2022-05-04 DIAGNOSIS — Z87.59 HISTORY OF PREGNANCY INDUCED HYPERTENSION: ICD-10-CM

## 2022-05-04 PROCEDURE — 99386 PREV VISIT NEW AGE 40-64: CPT | Performed by: NURSE PRACTITIONER

## 2022-05-04 RX ORDER — IBUPROFEN 800 MG/1
800 TABLET ORAL
Qty: 40 TABLET | Refills: 1 | Status: SHIPPED | OUTPATIENT
Start: 2022-05-04 | End: 2022-07-26

## 2022-05-04 RX ORDER — OLMESARTAN MEDOXOMIL 20 MG/1
20 TABLET ORAL DAILY
Qty: 30 TABLET | Refills: 0 | Status: SHIPPED | OUTPATIENT
Start: 2022-05-04 | End: 2022-05-27

## 2022-05-04 NOTE — PROGRESS NOTES
Rm 7    Chief Complaint   Patient presents with   [place] Establish Care     pt ate a piece of cheese this morning    Numbness     bilateral hands numbness and tingling         1. Have you been to the ER, urgent care clinic since your last visit? Hospitalized since your last visit? No    2. Have you seen or consulted any other health care providers outside of the [address] [place] [address] since your last visit? Include any pap smears or colon screening. No        Health Maintenance Due   Topic Date Due    Hepatitis C Screening  Never done    Depression Screen  Never done    Cervical cancer screen  12/11/2015    A1C test (Diabetic or Prediabetic)  06/20/2018    COVID-19 Vaccine (3 - Booster) 08/01/2021    Lipid Screen  02/05/2022           3 most recent PHQ Screens 5/4/2022   Little interest or pleasure in doing things Several days   Feeling down, depressed, irritable, or hopeless Several days   Total Score PHQ 2 2           Learning Assessment 9/16/2014   PRIMARY LEARNER Patient   HIGHEST LEVEL OF EDUCATION - PRIMARY LEARNER  GRADUATED HIGH SCHOOL OR GED   BARRIERS PRIMARY LEARNER NONE   CO-LEARNER CAREGIVER No   PRIMARY LANGUAGE ENGLISH   LEARNER PREFERENCE PRIMARY DEMONSTRATION   ANSWERED BY patient   RELATIONSHIP SELF         An After Visit Summary was printed and given to the patient. Arben Lucas

## 2022-05-04 NOTE — PATIENT INSTRUCTIONS
Learning About Attention Deficit Hyperactivity Disorder (ADHD) in Adults  What is ADHD? Attention deficit hyperactivity disorder (ADHD) is a condition in which people have a hard time paying attention. Adults with ADHD also may be more active than normal. They tend to act without thinking. ADHD may make it harder for them to focus, get organized, and finish tasks. ADHD most often starts in childhood and lasts into adulthood. Many adults don't know that they have ADHD until their children are diagnosed. Then they begin to see their own symptoms. Doctors don't know what causes ADHD. But it tends to run in families. What are the symptoms? The most common types of ADHD symptoms in adults are attention problems and hyperactivity. Attention problems  Adults with ADHD often find it hard to:  · Finish tasks that don't interest them or aren't easy. But they may become obsessed with activities that they find interesting and enjoy. · Keep relationships. · Focus their attention on conversations, reading materials, or jobs. They may change jobs a lot. · Remember things. They may misplace or lose things. · Pay attention. They are easily distracted. They find it hard to focus on one task. · Think before they act. They may make quick decisions. They may act before they think about the effect of their actions. Hyperactivity  Adults with ADHD may:  · Fidget. They may swing their legs, shift in their seats, or tap their fingers. · Move around a lot. They may feel \"revved up\" or on the go. They may not be able to slow down until they are very tired. · Find it hard to relax. They may feel restless and find it hard to do quiet things like read or watch TV. How does ADHD affect daily life? ADHD in adults may affect:  · Job performance. They may find it hard to organize their work, manage their time, and focus on one task at a time. They may forget, misplace, or lose things.  They may quit their jobs out of boredom. · Relationships. Adults with ADHD may find it hard to focus their attention on conversations. It is hard for them to \"read\" the behavior and moods of others and express their own feelings. · Temper. They may get easily frustrated. This often can make it harder for them to deal with stress. These adults may overreact and have a short, quick temper. · The ability to solve problems. Adults who have a hard time waiting for things they want may act before they think about the effect of their actions. They may take part in risky behaviors. These include unprotected sex, unsafe driving, alcohol and drug use, or unwise business ventures. How is ADHD treated? ADHD can be treated with medicines, behavior training, or counseling. Or it may be a combination of these treatments. Medicines  Stimulant medicines are most often used to treat ADHD. These may include:    · Amphetamines. (Examples are Adderall and Dexedrine).     · Methylphenidate. (Examples are Concerta, Daytrana, Focalin, Metadate, and Ritalin). Other medicines that may be used are:    · Atomoxetine. This includes Strattera, a nonstimulant medicine for ADHD.     · Antihypertensives. These include clonidine (such as Catapres) and guanfacine (such as Tenex).   · Antidepressants. These include bupropion (Wellbutrin). Behavior training  Behavior training can help adults with ADHD learn how to:    · Get organized. A daily organizer or planner can help these adults organize their daily tasks. They can write down appointments and other things they need to remember.     · Decrease distractions. They can set up their work or home environment so that there are fewer things that will distract them. They may find using headphones or a \"white noise\" machine helpful. College students can arrange a quiet living situation. They may need a single dorm room.     · Work on relationships.  Social skills training can help adults with ADHD relate to family, friends, and coworkers. Couples counseling or family therapy can also help improve relationships. Counseling  Counseling is not meant to treat inattention, hyperactivity, or impulsiveness. But it can help with some of the problems that go along with ADHD. These include not getting along well with others and having problems following rules. Where can you learn more? Go to http://www.gray.com/  Enter E081 in the search box to learn more about \"Learning About Attention Deficit Hyperactivity Disorder (ADHD) in Adults. \"  Current as of: June 16, 2021               Content Version: 13.2  © 2006-2022 [employer]. Care instructions adapted under license by [employer] (which disclaims liability or warranty for this information). If you have questions about a medical condition or this instruction, always ask your healthcare professional. [place] [address] any warranty or liability for your use of this information. Home Blood Pressure Test: About This Test  What is it? A home blood pressure test allows you to keep track of your blood pressure at home. Blood pressure is a measure of the force of blood against the walls of your arteries. Blood pressure readings include two numbers, such as 130/80 (say \"130 over 80\"). The first number is the systolic pressure. The second number is the diastolic pressure. Why is this test done? You may do this test at home to:  · Find out if you have high blood pressure. · Track your blood pressure if you have high blood pressure. · Track how well medicine is working to reduce high blood pressure. · Check how lifestyle changes, such as weight loss and exercise, are affecting blood pressure. How do you prepare for the test?  For at least 30 minutes before you take your blood pressure, don't exercise, drink caffeine, or smoke.  Empty your bladder before the test. Sit quietly with your back straight and both feet on the floor for at least 5 minutes. This helps you take your blood pressure while you feel comfortable and relaxed. How is the test done? · If your doctor recommends it, take your blood pressure twice a day. Take it in the morning and evening. · Sit with your arm slightly bent and resting on a table so that your upper arm is at the same level as your heart. · Use the same arm each time you take your blood pressure. · Place the blood pressure cuff on the bare skin of your upper arm. You may have to roll up your sleeve, remove your arm from the sleeve, or take your shirt off. · Wrap the blood pressure cuff around your upper arm so that the lower edge of the cuff is about 1 inch above the bend of your elbow. · Do not move, talk, or text while you take your blood pressure. Follow the instructions that came with your blood pressure monitor. They might be different from the following. · Press the on/off button on the automatic monitor. Then you may need to wait until the screen says the monitor is ready. · Press the start button. The cuff will inflate and deflate by itself. · Your blood pressure numbers will appear on the screen. · Wait one minute and take your blood pressure again. · If your monitor does not automatically save your numbers, write them in your log book, along with the date and time. Follow-up care is a key part of your treatment and safety. Be sure to make and go to all appointments, and call your doctor if you are having problems. It's also a good idea to keep a list of the medicines you take. Where can you learn more? Go to http://www.V[employer].com/  Enter C427 in the search box to learn more about \"Home Blood Pressure Test: About This Test.\"  Current as of: January 10, 2022               Content Version: 13.2  © 3237-9314 [employer], [employer].    Care instructions adapted under license by [employer] (which disclaims liability or warranty for this information). If you have questions about a medical condition or this instruction, always ask your healthcare professional. [address] any warranty or liability for your use of this information. Carpal Tunnel Syndrome: Care Instructions  Overview     Carpal tunnel syndrome is numbness, tingling, weakness, and pain in your hand, wrist, and sometimes forearm. It is caused by pressure on the median nerve. This nerve and several tough tissues called tendons run through a space in the wrist. This space is called the carpal tunnel. The repeated hand motions used in work and some hobbies and sports can put pressure on the median nerve. Pregnancy can cause carpal tunnel syndrome. Several conditions, such as diabetes, arthritis, and an underactive thyroid, can also cause it. You may be able to limit an activity or change the way you do it to reduce your symptoms. You also can take other steps to feel better. If your symptoms are mild, 1 to 2 weeks of home treatment are likely to ease your pain. Surgery is needed only if other treatments do not work. Follow-up care is a key part of your treatment and safety. Be sure to make and go to all appointments, and call your doctor if you are having problems. It's also a good idea to know your test results and keep a list of the medicines you take. How can you care for yourself at home? · If possible, stop or reduce the activity that causes your symptoms. If you cannot stop the activity, take frequent breaks to rest and stretch or change hand positions to do a task. Try switching hands, such as when using a computer mouse. · Try to avoid bending or twisting your wrists. · Ask your doctor if you can take an over-the-counter pain medicine, such as acetaminophen (Tylenol), ibuprofen (Advil, Motrin), or naproxen (Aleve). Be safe with medicines. Read and follow all instructions on the label.   · If your doctor prescribes corticosteroid medicine to help reduce pain and swelling, take it exactly as prescribed. Call your doctor if you think you are having a problem with your medicine. · Put ice or a cold pack on your wrist for 10 to 20 minutes at a time to ease pain. Put a thin cloth between the ice and your skin. · If your doctor or your physical or occupational therapist tells you to wear a wrist splint, wear it as directed to keep your wrist in a neutral position. This also eases pressure on your median nerve. · Ask your doctor whether you should have physical or occupational therapy to learn how to do tasks differently. · Try a yoga class to stretch your muscles and build strength in your hands and wrists. Yoga has been shown to ease carpal tunnel symptoms. To prevent carpal tunnel  · When working at a computer, keep your hands and wrists in line with your forearms. Hold your elbows close to your sides. Take a break every 10 to 15 minutes. · Try these exercises:  ? Warm up: Rotate your wrist up, down, and from side to side. Repeat this 4 times. Stretch your fingers far apart, relax them, then stretch them again. Repeat 4 times. Stretch your thumb by pulling it back gently, holding it, and then releasing it. Repeat 4 times. ? Prayer stretch: Start with your palms together in front of your chest just below your chin. Slowly lower your hands toward your waistline while keeping your hands close to your stomach and your palms together until you feel a mild to moderate stretch under your forearms. Hold for 10 to 20 seconds. Repeat 4 times. ? Wrist flexor stretch: Hold your arm in front of you with your palm up. Bend your wrist, pointing your hand toward the floor. With your other hand, gently bend your wrist further until you feel a mild to moderate stretch in your forearm. Hold for 10 to 20 seconds. Repeat 4 times. ? Wrist extensor stretch: Repeat the steps for the wrist flexor stretch, but begin with your extended hand palm down.   · Squeeze a rubber exercise ball several times a day to keep your hands and fingers strong. · Avoid holding objects (such as a book) in one position for a long time. When possible, use your whole hand to grasp an object. Using just the thumb and index finger can put stress on the wrist.  · Do not smoke. It can make this condition worse by reducing blood flow to the median nerve. If you need help quitting, talk to your doctor about stop-smoking programs and medicines. These can increase your chances of quitting for good. When should you call for help? Watch closely for changes in your health, and be sure to contact your doctor if:    · Your pain or other problems do not get better with home care.     · You want more information about physical or occupational therapy.     · You have side effects of your corticosteroid medicine, such as:  ? Weight gain. ? Mood changes. ? Trouble sleeping. ? Bruising easily.     · You have any other problems with your medicine. Where can you learn more? Go to http://www.gray.com/  Enter R432 in the search box to learn more about \"Carpal Tunnel Syndrome: Care Instructions. \"  Current as of: July 1, 2021               Content Version: 13.2  © 8955-4232 [employer]. Care instructions adapted under license by [employer] (which disclaims liability or warranty for this information). If you have questions about a medical condition or this instruction, always ask your healthcare professional. [address] any warranty or liability for your use of this information. DASH Diet: Care Instructions  Your Care Instructions     The DASH diet is an eating plan that can help lower your blood pressure. DASH stands for Dietary Approaches to Stop Hypertension. Hypertension is high blood pressure. The DASH diet focuses on eating foods that are high in calcium, potassium, and magnesium. These nutrients can lower blood pressure.  The foods that are highest in these nutrients are fruits, vegetables, low-fat dairy products, nuts, seeds, and legumes. But taking calcium, potassium, and magnesium supplements instead of eating foods that are high in those nutrients does not have the same effect. The DASH diet also includes whole grains, fish, and poultry. The DASH diet is one of several lifestyle changes your doctor may recommend to lower your high blood pressure. Your doctor may also want you to decrease the amount of sodium in your diet. Lowering sodium while following the DASH diet can lower blood pressure even further than just the DASH diet alone. Follow-up care is a key part of your treatment and safety. Be sure to make and go to all appointments, and call your doctor if you are having problems. It's also a good idea to know your test results and keep a list of the medicines you take. How can you care for yourself at home? Following the DASH diet  · Eat 4 to 5 servings of fruit each day. A serving is 1 medium-sized piece of fruit, ½ cup chopped or canned fruit, 1/4 cup dried fruit, or 4 ounces (½ cup) of fruit juice. Choose fruit more often than fruit juice. · Eat 4 to 5 servings of vegetables each day. A serving is 1 cup of lettuce or raw leafy vegetables, ½ cup of chopped or cooked vegetables, or 4 ounces (½ cup) of vegetable juice. Choose vegetables more often than vegetable juice. · Get 2 to 3 servings of low-fat and fat-free dairy each day. A serving is 8 ounces of milk, 1 cup of yogurt, or 1 ½ ounces of cheese. · Eat 6 to 8 servings of grains each day. A serving is 1 slice of bread, 1 ounce of dry cereal, or ½ cup of cooked rice, pasta, or cooked cereal. Try to choose whole-grain products as much as possible. · Limit lean meat, poultry, and fish to 2 servings each day. A serving is 3 ounces, about the size of a deck of cards. · Eat 4 to 5 servings of nuts, seeds, and legumes (cooked dried beans, lentils, and split peas) each week.  A serving is 1/3 cup of nuts, 2 tablespoons of seeds, or ½ cup of cooked beans or peas. · Limit fats and oils to 2 to 3 servings each day. A serving is 1 teaspoon of vegetable oil or 2 tablespoons of salad dressing. · Limit sweets and added sugars to 5 servings or less a week. A serving is 1 tablespoon jelly or jam, ½ cup sorbet, or 1 cup of lemonade. · Eat less than 2,300 milligrams (mg) of sodium a day. If you limit your sodium to 1,500 mg a day, you can lower your blood pressure even more. · Be aware that all of these are the suggested number of servings for people who eat 1,800 to 2,000 calories a day. Your recommended number of servings may be different if you need more or fewer calories. Tips for success  · Start small. Do not try to make dramatic changes to your diet all at once. You might feel that you are missing out on your favorite foods and then be more likely to not follow the plan. Make small changes, and stick with them. Once those changes become habit, add a few more changes. · Try some of the following:  ? Make it a goal to eat a fruit or vegetable at every meal and at snacks. This will make it easy to get the recommended amount of fruits and vegetables each day. ? Try yogurt topped with fruit and nuts for a snack or healthy dessert. ? Add lettuce, tomato, cucumber, and onion to sandwiches. ? Combine a ready-made pizza crust with low-fat mozzarella cheese and lots of vegetable toppings. Try using tomatoes, squash, spinach, broccoli, carrots, cauliflower, and onions. ? Have a variety of cut-up vegetables with a low-fat dip as an appetizer instead of chips and dip. ? Sprinkle sunflower seeds or chopped almonds over salads. Or try adding chopped walnuts or almonds to cooked vegetables. ? Try some vegetarian meals using beans and peas. Add garbanzo or kidney beans to salads. Make burritos and tacos with mashed reinoso beans or black beans. Where can you learn more?   Go to http://www.gray.com/  Enter [id number] in the search box to learn more about \"DASH Diet: Care Instructions. \"  Current as of: January 10, 2022               Content Version: 13.2  © 2006-2022 [employer]. Care instructions adapted under license by [employer] (which disclaims liability or warranty for this information). If you have questions about a medical condition or this instruction, always ask your healthcare professional. [place] [address] any warranty or liability for your use of this information. Learning About High Blood Pressure  What is high blood pressure? Blood pressure is a measure of how hard the blood pushes against the walls of your arteries. It's normal for blood pressure to go up and down throughout the day. But if it stays up, you have high blood pressure. Another name for high blood pressure is hypertension. Two numbers tell you your blood pressure. The first number is the systolic pressure (top number). It shows how hard the blood pushes when your heart is pumping. The second number is the diastolic pressure (bottom number). It shows how hard the blood pushes between heartbeats, when your heart is relaxed and filling with blood. Your doctor will give you a goal for your blood pressure based on your health and your age. High blood pressure (hypertension) means that the top number stays high, or the bottom number stays high, or both. High blood pressure increases the risk of stroke, heart attack, and other problems. What happens when you have high blood pressure? · Blood flows through your arteries with too much force. Over time, this can damage the heart and the walls of your arteries. But you can't feel it. High blood pressure usually doesn't cause symptoms. · High blood pressure makes your heart work harder.  And that can lead to heart failure, which means your heart doesn't pump as much blood as your body needs.  · Fat and calcium start to build up in your arteries. This buildup is called hardening of the arteries. It can cause many problems including a heart attack and stroke. · Arteries also carry blood and oxygen to organs like your eyes, kidneys, and brain. If high blood pressure damages those arteries, it can lead to vision loss, kidney disease, stroke, and a higher risk of dementia. How can you prevent high blood pressure? · Stay at a healthy weight. · Try to limit how much sodium you eat to less than 2,300 milligrams (mg) a day. If you limit your sodium to 1,500 mg a day, you can lower your blood pressure even more. ? Buy foods that are labeled \"unsalted,\" \"sodium-free,\" or \"low-sodium. \" Foods labeled \"reduced-sodium\" and \"light sodium\" may still have too much sodium. ? Flavor your food with garlic, lemon juice, onion, vinegar, herbs, and spices instead of salt. Do not use soy sauce, steak sauce, onion salt, garlic salt, mustard, or ketchup on your food. ? Use less salt (or none) when recipes call for it. You can often use half the salt a recipe calls for without losing flavor. · Be physically active. Get at least 30 minutes of exercise on most days of the week. Walking is a good choice. You also may want to do other activities, such as running, swimming, cycling, or playing tennis or team sports. · Limit alcohol to 2 drinks a day for men and 1 drink a day for women. · Eat plenty of fruits, vegetables, and low-fat dairy products. Eat less saturated and total fats. How is high blood pressure treated? · Your doctor will suggest making lifestyle changes to help your heart. For example, your doctor may ask you to eat healthy foods, quit smoking, lose extra weight, and be more active. · If lifestyle changes don't help enough, your doctor may recommend that you take medicine. · When blood pressure is very high, medicines are needed to lower it.   Follow-up care is a key part of your treatment and safety. Be sure to make and go to all appointments, and call your doctor if you are having problems. It's also a good idea to know your test results and keep a list of the medicines you take. Where can you learn more? Go to http://www.valdivia.com/  Enter P501 in the search box to learn more about \"Learning About High Blood Pressure. \"  Current as of: January 10, 2022               Content Version: 13.2  © 2006-2022 [employer]. Care instructions adapted under license by [employer] (which disclaims liability or warranty for this information). If you have questions about a medical condition or this instruction, always ask your healthcare professional. [address] any warranty or liability for your use of this information. Low Sodium Diet (2,000 Milligram): Care Instructions  Overview     Limiting sodium can be an important part of managing some health problems. The most common source of sodium is salt. People get most of the salt in their diet from canned, prepared, and packaged foods. Fast food and restaurant meals also are very high in sodium. Your doctor will probably limit your sodium to less than 2,000 milligrams (mg) a day. This limit counts all the sodium in prepared and packaged foods and any salt you add to your food. Follow-up care is a key part of your treatment and safety. Be sure to make and go to all appointments, and call your doctor if you are having problems. It's also a good idea to know your test results and keep a list of the medicines you take. How can you care for yourself at home? Read food labels  · Read labels on cans and food packages. The labels tell you how much sodium is in each serving. Make sure that you look at the serving size. If you eat more than the serving size, you have eaten more sodium. · Food labels also tell you the Percent Daily Value for sodium.  Choose products with low Percent Daily Values for sodium. · Be aware that sodium can come in forms other than salt, including monosodium glutamate (MSG), sodium citrate, and sodium bicarbonate (baking soda). MSG is often added to Asian food. When you eat out, you can sometimes ask for food without MSG or added salt. Buy low-sodium foods  · Buy foods that are labeled \"unsalted\" (no salt added), \"sodium-free\" (less than 5 mg of sodium per serving), or \"low-sodium\" (140 mg or less of sodium per serving). Foods labeled \"reduced-sodium\" and \"light sodium\" may still have too much sodium. Be sure to read the label to see how much sodium you are getting. · Buy fresh vegetables, or frozen vegetables without added sauces. Buy low-sodium versions of canned vegetables, soups, and other canned goods. Prepare low-sodium meals  · Cut back on the amount of salt you use in cooking. This will help you adjust to the taste. Do not add salt after cooking. One teaspoon of salt has about 2,300 mg of sodium. · Take the salt shaker off the table. · Flavor your food with garlic, lemon juice, onion, vinegar, herbs, and spices. Do not use soy sauce, lite soy sauce, steak sauce, onion salt, garlic salt, celery salt, or ketchup on your food. · Use low-sodium salad dressings, sauces, and ketchup. Or make your own salad dressings and sauces without adding salt. · Use less salt (or none) when recipes call for it. You can often use half the salt a recipe calls for without losing flavor. Other foods such as rice, pasta, and grains do not need added salt. · Rinse canned vegetables, and cook them in fresh water. This removes some--but not all--of the salt. · Avoid water that is naturally high in sodium or that has been treated with water softeners, which add sodium. If you buy bottled water, read the label and choose a sodium-free brand. Avoid high-sodium foods  · Avoid eating:  ? Smoked, cured, salted, and canned meat, fish, and poultry.   ? Ham, sorto, hot dogs, and luncheon meats.  ? Regular, hard, and processed cheese and regular peanut butter. ? Crackers with salted tops, and other salted snack foods such as pretzels, chips, and salted popcorn. ? Frozen prepared meals, unless labeled low-sodium. ? Canned and dried soups, broths, and bouillon, unless labeled sodium-free or low-sodium. ? Canned vegetables, unless labeled sodium-free or low-sodium. ? Western [place] fries, pizza, tacos, and other fast foods. ? Pickles, olives, ketchup, and other condiments, especially soy sauce, unless labeled sodium-free or low-sodium. Where can you learn more? Go to http://www.gray.com/  Enter V843 in the search box to learn more about \"Low Sodium Diet (2,000 Milligram): Care Instructions. \"  Current as of: September 8, 2021               Content Version: 13.2  © 1092-7787 [employer]. Care instructions adapted under license by [employer] (which disclaims liability or warranty for this information). If you have questions about a medical condition or this instruction, always ask your healthcare professional. [place] [address] any warranty or liability for your use of this information. Learning About Stimulant Medicines for Attention Deficit Hyperactivity Disorder (ADHD)  How are stimulant medicines used to treat ADHD? Stimulant medicines affect certain chemicals in the brain. They can help a person with ADHD to focus better. And they can make the person less hyperactive and impulsive. ADHD is treated with medicines and behavior therapy. Stimulants are the medicines used most.  What are some types of these medicines? Stimulant medicines may include:  · Dexmethylphenidate (Focalin). · Lisdexamfetamine (Vyvanse). · Methylphenidate (Concerta, Daytrana, Metadate CD, Methylin, Ritalin). · Mixed salts amphetamine (Adderall). How do you take them safely? · Take your medicines exactly as prescribed.    Call your doctor if you think you are having a problem with your medicine. You will get more details on the specific medicines your doctor prescribes. · Do not take \"make-up\" doses. If you miss a dose, and if it's not too late in the day, it's okay to take it. But don't double up doses. · Do not misuse your medicine. Some medicines for ADHD can be misused. Some people may take a larger dose than prescribed. They may take them for their non-medical effects. Or they may share or sell them. Misuse can lead to a stimulant use disorder. · Keep close track of your medicine. Don't sell or give medicine to other people. What are the side effects? Common side effects include loss of appetite, a headache, and an upset stomach. You may also have mood changes or sleep problems. Or you may feel nervous. Some stimulant medicines can cause a dry mouth. If these medicines have bothersome side effects or don't work for you, your doctor might prescribe another type of medicine. How long can you expect to use these medicines? Most doctors prescribe a low dose of stimulant medicines at first. Your doctor may have you slowly increase the dose until your symptoms are managed. Or you might get a different medicine or treatment. This can take several weeks. Some doctors may advise taking a break from the medicine over some weekends or during holidays. But this depends on the type of symptoms you have. You may need to take medicine for ADHD for a long time. But your doctor will check now and then to see if you can take a lower dose and still get the benefits of the medicine. If you want to stop or reduce your use of the medicine, talk to your doctor first. Some signs that you may be able to lower or stop your dose include:  · You have no symptoms for more than a year while you take the medicine. · You are doing better at the same dose. · Your behavior is appropriate even if you miss a dose or two.   · You are newly able to concentrate. Follow-up care is a key part of your treatment and safety. Be sure to make and go to all appointments, and call your doctor if you are having problems. It's also a good idea to know your test results and keep a list of the medicines you take. Where can you learn more? Go to http://www.gray.com/  Enter S155 in the search box to learn more about \"Learning About Stimulant Medicines for Attention Deficit Hyperactivity Disorder (ADHD). \"  Current as of: June 16, 2021               Content Version: 13.2  © 9439-7208 [employer], [employer]. Care instructions adapted under license by [employer] (which disclaims liability or warranty for this information). If you have questions about a medical condition or this instruction, always ask your healthcare professional. [address] any warranty or liability for your use of this information.

## 2022-05-04 NOTE — PROGRESS NOTES
Shelly Wilburn is a 38 y.o. female presents to [facility] care   HPI     Past Medical History:   Diagnosis Date    ADD (attention deficit disorder)     diagnosed in 9th grade by Dr. Virgilio Lange, pediatrician. on medication int since then    Anemia     Anxiety     Back pain     intermittent    Essential hypertension 3/24/2017    History of Clostridium difficile 2016    History of gestational diabetes     gestational diabetes- on glyburide    Prediabetes 2017    [facility]onephritis 2011    [facility]    Varicella [id number]       Current Outpatient Medications   Medication Sig    ibuprofen (MOTRIN) 800 mg tablet Take 1 Tablet by mouth every eight (8) hours as needed for Pain.  olmesartan (BENICAR) 20 mg tablet Take 1 Tablet by mouth daily. No current facility-administered medications for this visit. Allergies   Allergen Reactions    Prednisone Rash    Strattera [Atomoxetine] Rash       Past Surgical History:   Procedure Laterality Date    [place]       Family History   Problem Relation Age of Onset    Liver Disease Mother         cirrhosis    Hypertension Mother     Alcohol abuse Mother     Hypertension Father     Diabetes Father         on insulin    Cancer Father         lung    Alcohol abuse Father     Alcohol abuse Sister     Anxiety Sister     Depression Sister     Alcohol abuse Brother     Anxiety Brother         on lexapro    Hypertension Maternal Grandmother     Stroke Maternal Grandmother 73        aneurysm     Hypertension Paternal Grandmother        Psychosocial Hx:   Single   Three children 20,16 5  Working at [employer] for 16 years   No tobacco   Occasional alcohol  Mother  when she was 18  Acute situational  depression or anxiety   Grieving father's death. He  suddenly Thursday in nursing home   Ob/gyn Lyndell Fothergill Page    Hand numb and tingling, when she is laying down.  Types a lot on job  No self treatment     Diagnosed with ADHD at 10years old. Doing ok off medication; Vyanse   She would like to restart medication. Was told she has to see a psychiatrist in order to do so     2016 had gestational diabetes and PIH  Last 2 moths had cut out soda and going to gym  Was 89 prior to exercise program           Review of Systems   Constitutional: Positive for weight loss (intentional ). HENT: Negative. Eyes: Negative. Respiratory: Negative. Cardiovascular: Negative. Gastrointestinal: Negative. Genitourinary: Negative. Musculoskeletal: Negative. Skin: Negative. Neurological: Positive for tingling and sensory change. Psychiatric/Behavioral: Positive for depression. The patient is nervous/anxious. Objective  Visit Vitals  BP (!) 170/112 (BP 1 Location: Left upper arm, BP Patient Position: Sitting, BP Cuff Size: Adult)   Pulse (!) 106   Temp 98.7 °F (37.1 °C) (Oral)   Resp 16   Ht 5' 3\" (1.6 m)   Wt 168 lb 9.6 oz (76.5 kg)   SpO2 100%   BMI 29.87 kg/m²     Physical Exam  Constitutional:       General: She is not in acute distress. Appearance: Normal appearance. She is not ill-appearing. HENT:      Head: Normocephalic and atraumatic. Right Ear: Tympanic membrane, ear canal and external ear normal. There is no impacted cerumen. Left Ear: Tympanic membrane, ear canal and external ear normal.   Cardiovascular:      Rate and Rhythm: Regular rhythm. Tachycardia present. Pulses: Normal pulses. Heart sounds: Normal heart sounds. Pulmonary:      Effort: Pulmonary effort is normal.      Breath sounds: Normal breath sounds. Skin:     General: Skin is warm and dry. Findings: No erythema or rash. Neurological:      General: No focal deficit present. Mental Status: She is alert. Cranial Nerves: No cranial nerve deficit. Psychiatric:         Attention and Perception: Attention normal.         Mood and Affect: Mood is depressed.  Affect is tearful. Speech: Speech normal.         Behavior: Behavior is cooperative. Thought Content: Thought content normal.         Cognition and Memory: Cognition normal.          Assessment & Plan    ICD-10-CM ICD-9-CM    1. Physical exam, annual  Z00.00 V70.0 ibuprofen (MOTRIN) 800 mg tablet      CANCELED: CBC WITH AUTOMATED DIFF      CANCELED: CBC WITH AUTOMATED DIFF   2. Primary hypertension  I10 401.9 olmesartan (BENICAR) 20 mg tablet      AMB POC URINALYSIS DIP STICK MANUAL W/O MICRO   3. Attention deficit hyperactivity disorder (ADHD), combined type  F90.2 314.01 10-DRUG SCREEN, URINE W RFLX CONFIRMATION      CANCELED: TSH 3RD GENERATION      CANCELED: T4, FREE      CANCELED: T4, FREE      CANCELED: TSH 3RD GENERATION   4. Grief  F43.21 309.0    5. Bilateral carpal tunnel syndrome  G56.03 354.0 REFERRAL TO NEUROLOGY   6. History of pregnancy induced hypertension  Z87.59 V13.29 AMB POC URINALYSIS DIP STICK MANUAL W/O MICRO   7. History of gestational diabetes  Z86.32 V12.21 AMB POC URINALYSIS DIP STICK MANUAL W/O MICRO     Diagnoses and all orders for this visit:    1. Physical exam, annual  -     ibuprofen (MOTRIN) 800 mg tablet; Take 1 Tablet by mouth every eight (8) hours as needed for Pain. 2. Primary hypertension  -     olmesartan (BENICAR) 20 mg tablet; Take 1 Tablet by mouth daily.  -     AMB POC URINALYSIS DIP STICK MANUAL W/O MICRO    3. Attention deficit hyperactivity disorder (ADHD), combined type  -     10-DRUG SCREEN, URINE W RFLX CONFIRMATION; Future    4. Grief    5. Bilateral carpal tunnel syndrome  -     REFERRAL TO NEUROLOGY    6. History of pregnancy induced hypertension  -     AMB POC URINALYSIS DIP STICK MANUAL W/O MICRO    7.  History of gestational diabetes  -     AMB POC URINALYSIS DIP STICK MANUAL W/O MICRO    Other orders  -     CBC WITH AUTOMATED DIFF  -     METABOLIC PANEL, COMPREHENSIVE  -     LIPID PANEL  -     HEMOGLOBIN A1C WITH EAG  -     T4, FREE  -     TSH 3RD GENERATION      Follow-up and Dispositions    · Return in about 4 weeks (around 6/1/2022) for htn, adhd. Hypertension, new diagnosis. Discussed lifestyle management, indications for medical management, BP monitoring, short interval follow up     Encouraged neurology evaluation of likely entrapment syndrome/CTS. encouraged to wear wrist splints, trial of NSAID especially before bed, ergonomic key board    lab results and schedule of future lab studies reviewed with patient  reviewed diet, exercise and weight control  cardiovascular risk and specific lipid/LDL goals reviewed  reviewed medications and side effects in detail    Patient voices understanding and acceptance of this advice and will call back if any further questions or concerns. Patient voices understanding and acceptance of this advice and will call back if any further questions or concerns.   Crystal Lal, NP

## 2022-05-07 DIAGNOSIS — R74.8 ELEVATED LIVER ENZYMES: Primary | ICD-10-CM

## 2022-05-09 LAB
ALBUMIN SERPL-MCNC: 5.1 G/DL (ref 3.8–4.8)
ALBUMIN/GLOB SERPL: 1.8 {RATIO} (ref 1.2–2.2)
ALP SERPL-CCNC: 76 IU/L (ref 44–121)
ALT SERPL-CCNC: 90 IU/L (ref 0–32)
AST SERPL-CCNC: 35 IU/L (ref 0–40)
BASOPHILS # BLD AUTO: 0 X10E3/UL (ref 0–0.2)
BASOPHILS NFR BLD AUTO: 1 %
BILIRUB SERPL-MCNC: 0.3 MG/DL (ref 0–1.2)
BUN SERPL-MCNC: 14 MG/DL (ref 6–24)
BUN/CREAT SERPL: 16 (ref 9–23)
CALCIUM SERPL-MCNC: 10 MG/DL (ref 8.7–10.2)
CHLORIDE SERPL-SCNC: 103 MMOL/L (ref 96–106)
CHOLEST SERPL-MCNC: 211 MG/DL (ref 100–199)
CO2 SERPL-SCNC: 20 MMOL/L (ref 20–29)
CREAT SERPL-MCNC: 0.85 MG/DL (ref 0.57–1)
EOSINOPHIL # BLD AUTO: 0.1 X10E3/UL (ref 0–0.4)
EOSINOPHIL NFR BLD AUTO: 2 %
ERYTHROCYTE [DISTWIDTH] IN BLOOD BY AUTOMATED COUNT: 12.8 % (ref 11.7–15.4)
EST. AVERAGE GLUCOSE BLD GHB EST-MCNC: 114 MG/DL
GLOBULIN SER CALC-MCNC: 2.8 G/DL (ref 1.5–4.5)
GLUCOSE SERPL-MCNC: 110 MG/DL (ref 65–99)
HBA1C MFR BLD: 5.6 % (ref 4.8–5.6)
HCT VFR BLD AUTO: 44.8 % (ref 34–46.6)
HDLC SERPL-MCNC: 50 MG/DL
HGB BLD-MCNC: 14.2 G/DL (ref 11.1–15.9)
IMM GRANULOCYTES # BLD AUTO: 0 X10E3/UL (ref 0–0.1)
IMM GRANULOCYTES NFR BLD AUTO: 0 %
LDLC SERPL CALC-MCNC: 131 MG/DL (ref 0–99)
LYMPHOCYTES # BLD AUTO: 1.9 X10E3/UL (ref 0.7–3.1)
LYMPHOCYTES NFR BLD AUTO: 34 %
MCH RBC QN AUTO: 31.6 PG (ref 26.6–33)
MCHC RBC AUTO-ENTMCNC: 31.7 G/DL (ref 31.5–35.7)
MCV RBC AUTO: 100 FL (ref 79–97)
MONOCYTES # BLD AUTO: 0.4 X10E3/UL (ref 0.1–0.9)
MONOCYTES NFR BLD AUTO: 7 %
NEUTROPHILS # BLD AUTO: 3.2 X10E3/UL (ref 1.4–7)
NEUTROPHILS NFR BLD AUTO: 56 %
PLATELET # BLD AUTO: 201 X10E3/UL (ref 150–450)
POTASSIUM SERPL-SCNC: 4.1 MMOL/L (ref 3.5–5.2)
PROT SERPL-MCNC: 7.9 G/DL (ref 6–8.5)
RBC # BLD AUTO: 4.49 X10E6/UL (ref 3.77–5.28)
SODIUM SERPL-SCNC: 142 MMOL/L (ref 134–144)
T4 FREE SERPL-MCNC: 1.22 NG/DL (ref 0.82–1.77)
TRIGL SERPL-MCNC: 166 MG/DL (ref 0–149)
TSH SERPL DL<=0.005 MIU/L-ACNC: 1.14 UIU/ML (ref 0.45–4.5)
VLDLC SERPL CALC-MCNC: 30 MG/DL (ref 5–40)
WBC # BLD AUTO: 5.7 X10E3/UL (ref 3.4–10.8)

## 2022-05-09 NOTE — PROGRESS NOTES
Add on lab request form faxed to [facility] lab. [phone]. Confirmation received. Document placed in bin for centralized scanning.

## 2022-05-11 LAB
HAV IGM SERPL QL IA: NEGATIVE
HBV CORE IGM SERPL QL IA: NEGATIVE
HBV SURFACE AG SERPL QL IA: NEGATIVE
HCV AB S/CO SERPL IA: <0.1 S/CO RATIO (ref 0–0.9)
HCV AB SERPL QL IA: NORMAL
SPECIMEN STATUS REPORT, ROLRST: NORMAL

## 2022-05-26 DIAGNOSIS — I10 PRIMARY HYPERTENSION: ICD-10-CM

## 2022-05-27 RX ORDER — OLMESARTAN MEDOXOMIL 20 MG/1
20 TABLET ORAL DAILY
Qty: 30 TABLET | Refills: 0 | Status: SHIPPED | OUTPATIENT
Start: 2022-05-27 | End: 2022-06-02 | Stop reason: DRUGHIGH

## 2022-06-02 ENCOUNTER — OFFICE VISIT (OUTPATIENT)
Dept: INTERNAL MEDICINE CLINIC | Age: 42
End: 2022-06-02
Payer: MEDICAID

## 2022-06-02 VITALS
BODY MASS INDEX: 29.23 KG/M2 | SYSTOLIC BLOOD PRESSURE: 150 MMHG | OXYGEN SATURATION: 100 % | DIASTOLIC BLOOD PRESSURE: 90 MMHG | HEART RATE: 86 BPM | WEIGHT: 165 LBS | RESPIRATION RATE: 18 BRPM

## 2022-06-02 DIAGNOSIS — F43.21 GRIEF: ICD-10-CM

## 2022-06-02 DIAGNOSIS — F90.2 ATTENTION DEFICIT HYPERACTIVITY DISORDER (ADHD), COMBINED TYPE: ICD-10-CM

## 2022-06-02 DIAGNOSIS — I10 PRIMARY HYPERTENSION: Primary | ICD-10-CM

## 2022-06-02 DIAGNOSIS — F43.23 ADJUSTMENT DISORDER WITH MIXED ANXIETY AND DEPRESSED MOOD: ICD-10-CM

## 2022-06-02 PROCEDURE — 99214 OFFICE O/P EST MOD 30 MIN: CPT | Performed by: NURSE PRACTITIONER

## 2022-06-02 RX ORDER — BUPROPION HYDROCHLORIDE 150 MG/1
150 TABLET ORAL DAILY
Qty: 30 TABLET | Refills: 0 | Status: SHIPPED | OUTPATIENT
Start: 2022-06-02 | End: 2022-06-24 | Stop reason: SDUPTHER

## 2022-06-02 RX ORDER — OLMESARTAN MEDOXOMIL 40 MG/1
40 TABLET ORAL DAILY
Qty: 30 TABLET | Refills: 3 | Status: SHIPPED | OUTPATIENT
Start: 2022-06-02 | End: 2022-07-26 | Stop reason: SDUPTHER

## 2022-06-02 RX ORDER — MEDROXYPROGESTERONE ACETATE 150 MG/ML
150 INJECTION, SUSPENSION INTRAMUSCULAR ONCE
COMMUNITY

## 2022-06-02 NOTE — PROGRESS NOTES
Identified pt with two pt identifiers(name and ). Chief Complaint   Patient presents with    New Patient      There were no vitals filed for this visit. Health Maintenance Due   Topic    Cervical cancer screen     COVID-19 Vaccine (3 - Booster)       Depression Screening:  :     3 most recent PHQ Screens 2022   Little interest or pleasure in doing things Several days   Feeling down, depressed, irritable, or hopeless Several days   Total Score PHQ 2 2        Fall Risk Assessment:  :     No flowsheet data found. Abuse Screening:  :     No flowsheet data found. Coordination of Care Questionnaire:  :     1. Have you been to the ER, urgent care clinic since your last visit? Hospitalized since your last visit? No    2. Have you seen or consulted any other health care providers outside of the [address] [place] [address] since your last visit? Include any pap smears or colon screening.  No

## 2022-06-02 NOTE — PATIENT INSTRUCTIONS
Home Blood Pressure Test: About This Test  What is it? A home blood pressure test allows you to keep track of your blood pressure at home. Blood pressure is a measure of the force of blood against the walls of your arteries. Blood pressure readings include two numbers, such as 130/80 (say \"130 over 80\"). The first number is the systolic pressure. The second number is the diastolic pressure. Why is this test done? You may do this test at home to:  · Find out if you have high blood pressure. · Track your blood pressure if you have high blood pressure. · Track how well medicine is working to reduce high blood pressure. · Check how lifestyle changes, such as weight loss and exercise, are affecting blood pressure. How do you prepare for the test?  For at least 30 minutes before you take your blood pressure, don't exercise, drink caffeine, or smoke. Empty your bladder before the test. Sit quietly with your back straight and both feet on the floor for at least 5 minutes. This helps you take your blood pressure while you feel comfortable and relaxed. How is the test done? · If your doctor recommends it, take your blood pressure twice a day. Take it in the morning and evening. · Sit with your arm slightly bent and resting on a table so that your upper arm is at the same level as your heart. · Use the same arm each time you take your blood pressure. · Place the blood pressure cuff on the bare skin of your upper arm. You may have to roll up your sleeve, remove your arm from the sleeve, or take your shirt off. · Wrap the blood pressure cuff around your upper arm so that the lower edge of the cuff is about 1 inch above the bend of your elbow. · Do not move, talk, or text while you take your blood pressure. Follow the instructions that came with your blood pressure monitor. They might be different from the following. · Press the on/off button on the automatic monitor.  Then you may need to wait until the screen says the monitor is ready. · Press the start button. The cuff will inflate and deflate by itself. · Your blood pressure numbers will appear on the screen. · Wait one minute and take your blood pressure again. · If your monitor does not automatically save your numbers, write them in your log book, along with the date and time. Follow-up care is a key part of your treatment and safety. Be sure to make and go to all appointments, and call your doctor if you are having problems. It's also a good idea to keep a list of the medicines you take. Where can you learn more? Go to http://www.valdivia.com/  Enter C427 in the search box to learn more about \"Home Blood Pressure Test: About This Test.\"  Current as of: January 10, 2022               Content Version: 13.2  © 2006-2022 [employer]. Care instructions adapted under license by [employer] (which disclaims liability or warranty for this information). If you have questions about a medical condition or this instruction, always ask your healthcare professional. [place] [address] any warranty or liability for your use of this information. DASH Diet: Care Instructions  Your Care Instructions     The DASH diet is an eating plan that can help lower your blood pressure. DASH stands for Dietary Approaches to Stop Hypertension. Hypertension is high blood pressure. The DASH diet focuses on eating foods that are high in calcium, potassium, and magnesium. These nutrients can lower blood pressure. The foods that are highest in these nutrients are fruits, vegetables, low-fat dairy products, nuts, seeds, and legumes. But taking calcium, potassium, and magnesium supplements instead of eating foods that are high in those nutrients does not have the same effect. The DASH diet also includes whole grains, fish, and poultry.   The DASH diet is one of several lifestyle changes your doctor may recommend to lower your high blood pressure. Your doctor may also want you to decrease the amount of sodium in your diet. Lowering sodium while following the DASH diet can lower blood pressure even further than just the DASH diet alone. Follow-up care is a key part of your treatment and safety. Be sure to make and go to all appointments, and call your doctor if you are having problems. It's also a good idea to know your test results and keep a list of the medicines you take. How can you care for yourself at home? Following the DASH diet  · Eat 4 to 5 servings of fruit each day. A serving is 1 medium-sized piece of fruit, ½ cup chopped or canned fruit, 1/4 cup dried fruit, or 4 ounces (½ cup) of fruit juice. Choose fruit more often than fruit juice. · Eat 4 to 5 servings of vegetables each day. A serving is 1 cup of lettuce or raw leafy vegetables, ½ cup of chopped or cooked vegetables, or 4 ounces (½ cup) of vegetable juice. Choose vegetables more often than vegetable juice. · Get 2 to 3 servings of low-fat and fat-free dairy each day. A serving is 8 ounces of milk, 1 cup of yogurt, or 1 ½ ounces of cheese. · Eat 6 to 8 servings of grains each day. A serving is 1 slice of bread, 1 ounce of dry cereal, or ½ cup of cooked rice, pasta, or cooked cereal. Try to choose whole-grain products as much as possible. · Limit lean meat, poultry, and fish to 2 servings each day. A serving is 3 ounces, about the size of a deck of cards. · Eat 4 to 5 servings of nuts, seeds, and legumes (cooked dried beans, lentils, and split peas) each week. A serving is 1/3 cup of nuts, 2 tablespoons of seeds, or ½ cup of cooked beans or peas. · Limit fats and oils to 2 to 3 servings each day. A serving is 1 teaspoon of vegetable oil or 2 tablespoons of salad dressing. · Limit sweets and added sugars to 5 servings or less a week. A serving is 1 tablespoon jelly or jam, ½ cup sorbet, or 1 cup of lemonade.   · Eat less than 2,300 milligrams (mg) of sodium a day. If you limit your sodium to 1,500 mg a day, you can lower your blood pressure even more. · Be aware that all of these are the suggested number of servings for people who eat 1,800 to 2,000 calories a day. Your recommended number of servings may be different if you need more or fewer calories. Tips for success  · Start small. Do not try to make dramatic changes to your diet all at once. You might feel that you are missing out on your favorite foods and then be more likely to not follow the plan. Make small changes, and stick with them. Once those changes become habit, add a few more changes. · Try some of the following:  ? Make it a goal to eat a fruit or vegetable at every meal and at snacks. This will make it easy to get the recommended amount of fruits and vegetables each day. ? Try yogurt topped with fruit and nuts for a snack or healthy dessert. ? Add lettuce, tomato, cucumber, and onion to sandwiches. ? Combine a ready-made pizza crust with low-fat mozzarella cheese and lots of vegetable toppings. Try using tomatoes, squash, spinach, broccoli, carrots, cauliflower, and onions. ? Have a variety of cut-up vegetables with a low-fat dip as an appetizer instead of chips and dip. ? Sprinkle sunflower seeds or chopped almonds over salads. Or try adding chopped walnuts or almonds to cooked vegetables. ? Try some vegetarian meals using beans and peas. Add garbanzo or kidney beans to salads. Make burritos and tacos with mashed reinoso beans or black beans. Where can you learn more? Go to http://www.ComputeNext.com/  Enter H967 in the search box to learn more about \"DASH Diet: Care Instructions. \"  Current as of: January 10, 2022               Content Version: 13.2  © 1118-8683 [employer]. Care instructions adapted under license by [employer] (which disclaims liability or warranty for this information).  If you have questions about a medical condition or this instruction, always ask your healthcare professional. [place] [address] any warranty or liability for your use of this information. Adjustment Disorder: Care Instructions  Overview     Adjustment disorder is a mental health condition that results from stress and can cause severe emotional and behavioral responses. But your response to the stress is far more severe than expected. It is severe enough to affect your work or social life and may lead to depression and physical pains and problems. Events that may cause this response can include a divorce, money problems, or starting school or a new job. It might be anything that causes some stress. This disorder is most often a short-term condition. It happens within 3 months of the stressful event or change. If the response lasts longer than 6 months after the event ends, you may have a different mental health condition. Follow-up care is a key part of your treatment and safety. Be sure to make and go to all appointments, and call your doctor if you are having problems. It's also a good idea to know your test results and keep a list of the medicines you take. How can you care for yourself at home? · Go to all counseling sessions. Do not skip any because you are feeling better. · If your doctor prescribed medicines, take them exactly as prescribed. Call your doctor if you think you are having a problem with your medicine. You will get more details on the specific medicines your doctor prescribes. · Discuss the causes of your stress with a good friend or family member. Or you can join a support group for people with similar problems. Talking to others sometimes relieves stress. · Get at least 30 minutes of exercise on most days of the week. Walking is a good choice. You also may want to do other activities, such as running, swimming, cycling, or playing tennis or team sports.   Relaxation techniques  Do relaxation exercises 10 to 20 minutes a day. You can play soothing, relaxing music while you do them, if you wish. · Tell others in your house that you are going to do your relaxation exercises. Ask them not to disturb you. · Find a comfortable, quiet place. · Lie down on your back, or sit with your back straight. · Focus on your breathing. Make it slow and steady. · Breathe in through your nose. Breathe out through either your nose or mouth. · Breathe deeply, filling up the area between your navel and your rib cage. Breathe so that your belly goes up and down. · Do not hold your breath. · Breathe like this for 5 to 10 minutes. Notice the feeling of calmness throughout your whole body. As you continue to breathe slowly and deeply, relax by doing these next steps for another 5 to 10 minutes:  · Tighten and relax each muscle group in your body. Start at your toes, and work your way up to your head. · Imagine your muscle groups relaxing and getting heavy. · Empty your mind of all thoughts. · Let yourself relax more and more deeply. · Be aware of the state of calmness that surrounds you. · When your relaxation time is over, you can bring yourself back to alertness by moving your fingers and toes. Then move your hands and feet. And then move your entire body. Sometimes people fall asleep during relaxation. But they most often wake up soon. · Always give yourself time to return to full alertness before you drive a car. Wait to do anything that might cause an accident if you are not fully alert. Never play a relaxation tape while you drive a car. When should you call for help? Call 911 anytime you think you may need emergency care. For example, call if:    · You feel you cannot stop from hurting yourself or someone else. If you or someone you know talks about suicide, self-harm, or feeling hopeless, get help right away.  Call the [facility] at 1-800-273-talk (5-322.325.2448) or text HOME to 201008 to access the Crisis Text Line. Consider saving these numbers in your phone. Watch closely for changes in your health, and be sure to contact your doctor if:    · You have new anxiety, or your anxiety gets worse.     · You have been feeling sad, depressed, or hopeless or have lost interest in things that you usually enjoy.     · You do not get better as expected. Where can you learn more? Go to http://www.gray.com/  Enter R087 in the search box to learn more about \"Adjustment Disorder: Care Instructions. \"  Current as of: June 16, 2021               Content Version: 13.2  © 5443-1202 [employer]. Care instructions adapted under license by [employer] (which disclaims liability or warranty for this information). If you have questions about a medical condition or this instruction, always ask your healthcare professional. [address] any warranty or liability for your use of this information. Grief (Actual/Anticipated): Care Instructions  Overview     Grief is an emotional and physical reaction to a major loss. The words \"sorrow\" and \"heartache\" often are used to describe feelings of grief. You may feel grief when you lose a beloved person, pet, place, or thing. It is also natural to feel grief when you lose a valued way of life, such as a job, marriage, or good health. You may begin to grieve before a loss occurs. You may grieve for a loved one who is sick and dying. Children and adults often feel the pain of loss before a big move or divorce. Grief is different for each person. There is no \"normal\" or \"expected\" period of time for grieving. Grieving can cause problems such as headaches, loss of appetite, and trouble with thinking or sleeping. You may withdraw from friends and family and behave in ways that are unusual for you. Grief may cause you to question your beliefs and views about life.   Grief is natural and does not require medical treatment. It may help to talk with people who have been through or are going through similar losses. You may also want to talk to a counselor about your feelings. Talking about your loss, sharing your cares and concerns, and getting support from others are important parts of healthy grieving. Follow-up care is a key part of your treatment and safety. Be sure to make and go to all appointments, and call your doctor if you are having problems. It's also a good idea to know your test results and keep a list of the medicines you take. How can you care for yourself at home? · Get enough sleep. Missing sleep may make it harder for you to cope with your grief. · Eat healthy foods. Ask someone to join you for a meal if you don't like eating alone. · Get some exercise every day. Even a walk can help you deal with your grief. Other exercises, such as yoga, may also help you manage stress. · Stay involved in your life. Don't withdraw from the activities you enjoy. People you know at work, Yazidism, clubs, or other groups can help you. · Comfort yourself. Familiar surroundings and special items, such as photos or a loved one's favorite shirt, may give you comfort. · Think about joining a support group. When should you call for help? Call 911 anytime you think you may need emergency care. For example, call if:    · You feel you cannot stop from hurting yourself or someone else. Watch closely for changes in your health, and be sure to contact your doctor if:    · You think you may be depressed.     · You do not get better as expected. Where can you learn more? Go to http://www.gray.com/  Enter H249 in the search box to learn more about \"Grief (Actual/Anticipated): Care Instructions. \"  Current as of: October 18, 2021               Content Version: 13.2  © 4708-0105 [employer].    Care instructions adapted under license by [employer] (which disclaims liability or warranty for this information). If you have questions about a medical condition or this instruction, always ask your healthcare professional. [place] [address] any warranty or liability for your use of this information.

## 2022-06-03 NOTE — PROGRESS NOTES
Subjective  Jolynn Mandujano is a 38 y.o. female presents for hypertension follow up   HPI     Past Medical History:   Diagnosis Date    ADD (attention deficit disorder)     diagnosed in 9th grade by Dr. Vanda Bhagat, pediatrician. on medication int since then    Anemia     Anxiety     Back pain     intermittent    Essential hypertension 3/24/2017    History of Clostridium difficile 2016    History of gestational diabetes     gestational diabetes- on glyburide    Prediabetes 2017    Pyelonephritis 2011    by [facility] [id number]       Allergies   Allergen Reactions    Prednisone Rash    Strattera [Atomoxetine] Rash       Past Surgical History:   Procedure Laterality Date    AR  DELIVERY ONLY      2016       Current Outpatient Medications   Medication Sig    medroxyPROGESTERone (Depo-Provera) 150 mg/mL injection 150 mg by IntraMUSCular route once.  olmesartan (BENICAR) 40 mg tablet Take 1 Tablet by mouth daily.  buPROPion XL (WELLBUTRIN XL) 150 mg tablet Take 1 Tablet by mouth daily.  ibuprofen (MOTRIN) 800 mg tablet Take 1 Tablet by mouth every eight (8) hours as needed for Pain. (Patient not taking: Reported on 2022)     No current facility-administered medications for this visit. She takes BP medication daily as prescribed   No BP monitoring   Denies ADRs    Relationship with brother has not improved since father's death; they are not speaking     Landlord wants her out in 30 days in order to sell house. She has no place to go currently    She has checked with insurer about ADD medication coverage. Was advised  All ADD medications are  non formulary  She recalls ADR to Strattera             Review of Systems   Constitutional: Positive for malaise/fatigue. Eyes: Negative. Cardiovascular: Negative. Psychiatric/Behavioral: Negative for depression. The patient is nervous/anxious.         Objective  Visit Vitals  BP (!) 150/90 (BP 1 Location: Left arm, BP Patient Position: Sitting, BP Cuff Size: Adult) Comment (BP Cuff Size): Manual   Pulse 86   Resp 18   Wt 165 lb (74.8 kg)   SpO2 100%   BMI 29.23 kg/m²     Physical Exam  Constitutional:       Appearance: Normal appearance. HENT:      Head: Normocephalic. Cardiovascular:      Rate and Rhythm: Normal rate and regular rhythm. Pulses: Normal pulses. Heart sounds: Normal heart sounds. Pulmonary:      Effort: Pulmonary effort is normal.      Breath sounds: Normal breath sounds. Neurological:      General: No focal deficit present. Mental Status: She is alert. Mental status is at baseline. Psychiatric:         Behavior: Behavior normal.         Thought Content: Thought content normal.      Comments: Mood improved since LOV          Assessment & Plan    ICD-10-CM ICD-9-CM    1. Primary hypertension  I10 401.9 olmesartan (BENICAR) 40 mg tablet   2. Attention deficit hyperactivity disorder (ADHD), combined type  F90.2 314.01 buPROPion XL (WELLBUTRIN XL) 150 mg tablet   3. Grief  F43.21 309.0    4. Adjustment disorder with mixed anxiety and depressed mood  F43.23 309.28 buPROPion XL (WELLBUTRIN XL) 150 mg tablet     Diagnoses and all orders for this visit:    1. Primary hypertension  -     olmesartan (BENICAR) 40 mg tablet; Take 1 Tablet by mouth daily. 2. Attention deficit hyperactivity disorder (ADHD), combined type  -     buPROPion XL (WELLBUTRIN XL) 150 mg tablet; Take 1 Tablet by mouth daily. 3. Grief    4. Adjustment disorder with mixed anxiety and depressed mood  -     buPROPion XL (WELLBUTRIN XL) 150 mg tablet; Take 1 Tablet by mouth daily. Follow-up and Dispositions    · Return in about 4 weeks (around 6/30/2022) for htn, add. Hypertension uncontrolled. Discussed need for medication adjustment; she agrees with management plan.  Reinforced lifestyle management     Discussed trial of above medication for anxiety/depression      lab results and schedule of future lab studies reviewed with patient  reviewed diet, exercise and weight control  cardiovascular risk and specific lipid/LDL goals reviewed  reviewed medications and side effects in detail    Patient voices understanding and acceptance of this advice and will call back if any further questions or concerns.   Perez Pulido NP

## 2022-06-23 ENCOUNTER — PATIENT MESSAGE (OUTPATIENT)
Dept: INTERNAL MEDICINE CLINIC | Age: 42
End: 2022-06-23

## 2022-06-23 DIAGNOSIS — F43.23 ADJUSTMENT DISORDER WITH MIXED ANXIETY AND DEPRESSED MOOD: ICD-10-CM

## 2022-06-23 DIAGNOSIS — F90.2 ATTENTION DEFICIT HYPERACTIVITY DISORDER (ADHD), COMBINED TYPE: ICD-10-CM

## 2022-06-24 NOTE — TELEPHONE ENCOUNTER
Bupropion last filled 6/2/22  LOV 6/2/22  Future Appointments   Date Time Provider Princess Card   7/26/2022  1:30 PM Jovany Swift NP [facility]

## 2022-06-24 NOTE — TELEPHONE ENCOUNTER
From: [place]  To: [facility]  Sent: 6/23/2022 6:23 AM EDT  Subject: Refill     Good morning I will need a refill on Bupropion XL 150mg. It was written for a month and to follow up but Ms. Raúl Mcnally was booked until the end of July. I made an appointment for the next available. I feel that its working and I have not had any side effects. I know the holiday is coming up and didnt want to wait until the last minute. Thank you have a great day.

## 2022-06-27 DIAGNOSIS — F43.23 ADJUSTMENT DISORDER WITH MIXED ANXIETY AND DEPRESSED MOOD: ICD-10-CM

## 2022-06-27 DIAGNOSIS — F90.2 ATTENTION DEFICIT HYPERACTIVITY DISORDER (ADHD), COMBINED TYPE: ICD-10-CM

## 2022-06-30 RX ORDER — BUPROPION HYDROCHLORIDE 150 MG/1
150 TABLET ORAL DAILY
Qty: 30 TABLET | Refills: 0 | Status: SHIPPED | OUTPATIENT
Start: 2022-06-30 | End: 2022-07-26 | Stop reason: SDUPTHER

## 2022-07-26 ENCOUNTER — OFFICE VISIT (OUTPATIENT)
Dept: INTERNAL MEDICINE CLINIC | Age: 42
End: 2022-07-26
Payer: MEDICAID

## 2022-07-26 VITALS
OXYGEN SATURATION: 94 % | TEMPERATURE: 98.4 F | BODY MASS INDEX: 28.53 KG/M2 | WEIGHT: 161 LBS | HEART RATE: 87 BPM | RESPIRATION RATE: 18 BRPM | SYSTOLIC BLOOD PRESSURE: 139 MMHG | HEIGHT: 63 IN | DIASTOLIC BLOOD PRESSURE: 89 MMHG

## 2022-07-26 DIAGNOSIS — F43.23 ADJUSTMENT DISORDER WITH MIXED ANXIETY AND DEPRESSED MOOD: ICD-10-CM

## 2022-07-26 DIAGNOSIS — I10 PRIMARY HYPERTENSION: ICD-10-CM

## 2022-07-26 DIAGNOSIS — F90.2 ATTENTION DEFICIT HYPERACTIVITY DISORDER (ADHD), COMBINED TYPE: ICD-10-CM

## 2022-07-26 PROCEDURE — 99213 OFFICE O/P EST LOW 20 MIN: CPT | Performed by: NURSE PRACTITIONER

## 2022-07-26 RX ORDER — BUPROPION HYDROCHLORIDE 150 MG/1
150 TABLET ORAL DAILY
Qty: 30 TABLET | Refills: 5 | Status: SHIPPED | OUTPATIENT
Start: 2022-07-26 | End: 2022-07-29

## 2022-07-26 RX ORDER — OLMESARTAN MEDOXOMIL 40 MG/1
40 TABLET ORAL DAILY
Qty: 30 TABLET | Refills: 5 | Status: SHIPPED | OUTPATIENT
Start: 2022-07-26 | End: 2022-11-01 | Stop reason: SDUPTHER

## 2022-07-26 NOTE — PROGRESS NOTES
Subjective  Ian Mcginnis is a 38 y.o. female presents for follow up   HPI    Past Medical History:   Diagnosis Date    ADD (attention deficit disorder)     diagnosed in 9th grade by Dr. Chelsea Villatoro, pediatrician. on medication int since then    Anemia     Anxiety     Back pain     intermittent    Essential hypertension 3/24/2017    History of Clostridium difficile 2016    History of gestational diabetes     gestational diabetes- on glyburide    Prediabetes 2017    Pyelonephritis 2011    by [facility] [id number]        Current Outpatient Medications   Medication Sig    buPROPion XL (WELLBUTRIN XL) 150 mg tablet Take 1 Tablet by mouth daily. medroxyPROGESTERone (DEPO-PROVERA) 150 mg/mL injection 150 mg by IntraMUSCular route once. olmesartan (BENICAR) 40 mg tablet Take 1 Tablet by mouth daily. buPROPion XL (WELLBUTRIN XL) 150 mg tablet TAKE 1 TABLET BY MOUTH DAILY    ibuprofen (MOTRIN) 800 mg tablet Take 1 Tablet by mouth every eight (8) hours as needed for Pain. (Patient not taking: Reported on 2022)     No current facility-administered medications for this visit. Allergies   Allergen Reactions    Prednisone Rash    Strattera [Atomoxetine] Rash        Past Surgical History:   Procedure Laterality Date    [place]  DELIVERY ONLY      2016        Blood pressure medication up titrated and anti depressant started LOV  She takes medications daily. Denies ADRs  Temporary housing. Hopes to fine housing closer to work by August   In a better mood. Car broke down yesterday and she did not panic     Review of Systems   Constitutional: Negative. Respiratory: Negative. Cardiovascular: Negative. Gastrointestinal: Negative. Genitourinary: Negative. Neurological:  Negative for dizziness and headaches. Psychiatric/Behavioral:  Negative for depression. Objective  Physical Exam  Constitutional:       General: She is not in acute distress. Appearance: Normal appearance. She is not ill-appearing. Cardiovascular:      Rate and Rhythm: Normal rate and regular rhythm. Pulses: Normal pulses. Heart sounds: Normal heart sounds. Pulmonary:      Effort: Pulmonary effort is normal.      Breath sounds: Normal breath sounds. Skin:     General: Skin is warm and dry. Neurological:      Mental Status: She is alert. Mental status is at baseline. Psychiatric:         Mood and Affect: Mood normal.         Behavior: Behavior normal.         Thought Content: Thought content normal.        Assessment & Plan    ICD-10-CM ICD-9-CM    1. Attention deficit hyperactivity disorder (ADHD), combined type  F90.2 314.01 buPROPion XL (WELLBUTRIN XL) 150 mg tablet      2. Adjustment disorder with mixed anxiety and depressed mood  F43.23 309.28 buPROPion XL (WELLBUTRIN XL) 150 mg tablet      3. Primary hypertension  I10 401.9 olmesartan (BENICAR) 40 mg tablet        Diagnoses and all orders for this visit:    1. Attention deficit hyperactivity disorder (ADHD), combined type  -     buPROPion XL (WELLBUTRIN XL) 150 mg tablet; Take 1 Tablet by mouth in the morning. 2. Adjustment disorder with mixed anxiety and depressed mood  -     buPROPion XL (WELLBUTRIN XL) 150 mg tablet; Take 1 Tablet by mouth in the morning. 3. Primary hypertension  -     olmesartan (BENICAR) 40 mg tablet; Take 1 Tablet by mouth in the morning. Follow-up and Dispositions    Return in about 3 months (around 10/26/2022) for htn, depression.        current treatment plan is effective, no change in therapy  lab results and schedule of future lab studies reviewed with patient  reviewed diet, exercise and weight control  reviewed medications and side effects in detail  Raji Chauhan NP

## 2022-07-26 NOTE — PROGRESS NOTES
Richelle Arenas is a 38 y.o. female   Chief Complaint   Patient presents with    Follow-up    1. Have you been to the ER, no urgent care clinic since your last visit? no Hospitalized since your last visit? no    2. Have you seen or consulted any other health care providers outside of the [address] [place] [address] since your last visit? Yes [facility] Include any pap smears or colon screening.  no

## 2022-07-27 DIAGNOSIS — F43.23 ADJUSTMENT DISORDER WITH MIXED ANXIETY AND DEPRESSED MOOD: ICD-10-CM

## 2022-07-27 DIAGNOSIS — F90.2 ATTENTION DEFICIT HYPERACTIVITY DISORDER (ADHD), COMBINED TYPE: ICD-10-CM

## 2022-07-29 RX ORDER — BUPROPION HYDROCHLORIDE 150 MG/1
TABLET ORAL
Qty: 30 TABLET | Refills: 5 | Status: SHIPPED | OUTPATIENT
Start: 2022-07-29 | End: 2022-08-18 | Stop reason: DRUGHIGH

## 2022-08-15 ENCOUNTER — PATIENT MESSAGE (OUTPATIENT)
Dept: INTERNAL MEDICINE CLINIC | Age: 42
End: 2022-08-15

## 2022-08-18 RX ORDER — BUPROPION HYDROCHLORIDE 300 MG/1
300 TABLET ORAL DAILY
Qty: 30 TABLET | Refills: 3 | Status: SHIPPED | OUTPATIENT
Start: 2022-08-18 | End: 2022-11-01 | Stop reason: SDUPTHER

## 2022-08-18 NOTE — TELEPHONE ENCOUNTER
From: Alyce Brunner  To: Saumya Cervantes NP  Sent: 8/15/2022 11:17 AM EDT  Subject: Increase    Good morning, you mentioned we could increase  buPROPion  mg tablet just to let you know. I would like to try an increase please.   Thank you  Vinh Salcedo

## 2022-11-01 ENCOUNTER — VIRTUAL VISIT (OUTPATIENT)
Dept: INTERNAL MEDICINE CLINIC | Age: 42
End: 2022-11-01
Payer: MEDICAID

## 2022-11-01 DIAGNOSIS — I10 PRIMARY HYPERTENSION: Primary | ICD-10-CM

## 2022-11-01 DIAGNOSIS — F43.23 ADJUSTMENT DISORDER WITH MIXED ANXIETY AND DEPRESSED MOOD: ICD-10-CM

## 2022-11-01 PROCEDURE — 99213 OFFICE O/P EST LOW 20 MIN: CPT | Performed by: NURSE PRACTITIONER

## 2022-11-01 RX ORDER — NORETHINDRONE ACETATE AND ETHINYL ESTRADIOL 1MG-20(21)
KIT ORAL
COMMUNITY
Start: 2022-10-29 | End: 2022-11-01 | Stop reason: SDUPTHER

## 2022-11-01 RX ORDER — OLMESARTAN MEDOXOMIL 40 MG/1
40 TABLET ORAL DAILY
Qty: 90 TABLET | Refills: 5 | Status: SHIPPED | OUTPATIENT
Start: 2022-11-01

## 2022-11-01 RX ORDER — BUPROPION HYDROCHLORIDE 300 MG/1
300 TABLET ORAL DAILY
Qty: 90 TABLET | Refills: 3 | Status: SHIPPED | OUTPATIENT
Start: 2022-11-01

## 2022-11-01 NOTE — PROGRESS NOTES
Chief Complaint   Patient presents with    Medication Refill     Patient is in the state of  [place] [facility]  Verified name and   Virtual link sent via text [phone]    There were no vitals filed for this visit. Health Maintenance will be followed up by PCP. 1. Have you been to the ER, urgent care clinic since your last visit? Hospitalized since your last visit? Yes [facility]    2. Have you seen or consulted any other health care providers outside of the [address] [place] [address] since your last visit? Include any pap smears or colon screening.  No

## 2022-11-23 NOTE — PROGRESS NOTES
Shelly Escalante is a 38 y.o. female presents for medication refill   Martir Escalante, was evaluated through a synchronous (real-time) audio-video encounter. The patient (or guardian if applicable) is aware that this is a billable service. Verbal consent to proceed has been obtained. The visit was conducted pursuant to the emergency declaration under the [facility], [address] [place] [address] and the [employer] General Act. Patient identification was verified, and a caregiver was present when appropriate. Services were provided through a video synchronous discussion virtually to substitute for in-person encounter. --Aashish Anderson NP on 2022 at 4:16 AM    An electronic signature was used to authenticate this note. HPI  BP today 140/90  -140  She wants to defer BP medication adjustment. Will work on lifestyle changes   Wellbutrin effective for depression. Need 90 day supply  Denies ADRs  Past Medical History:   Diagnosis Date    ADD (attention deficit disorder)     diagnosed in 9th grade by Dr. Kevin Jorgensen, pediatrician. on medication int since then    Anemia     Anxiety     Back pain     intermittent    Essential hypertension 3/24/2017    History of Clostridium difficile 2016    History of gestational diabetes 2016    gestational diabetes- on glyburide    Prediabetes 2017    Pyelonephritis 2011    by [facility] [id number]        Allergies   Allergen Reactions    Prednisone Rash    Strattera [Atomoxetine] Rash        Past Surgical History:   Procedure Laterality Date    [place]  DELIVERY ONLY      2016        Current Outpatient Medications on File Prior to Visit   Medication Sig Dispense Refill    medroxyPROGESTERone (DEPO-PROVERA) 150 mg/mL injection 150 mg by IntraMUSCular route once. No current facility-administered medications on file prior to visit. Review of Systems   Constitutional: Negative. Respiratory: Negative. Cardiovascular: Negative. Skin: Negative. Neurological: Negative. Psychiatric/Behavioral: Negative. Objective  Physical Exam  Constitutional:       General: She is not in acute distress. Appearance: Normal appearance. She is not ill-appearing. Neurological:      Mental Status: She is alert. Cranial Nerves: No cranial nerve deficit. Psychiatric:         Mood and Affect: Mood normal.         Behavior: Behavior normal.         Thought Content: Thought content normal.        Assessment & Plan    ICD-10-CM ICD-9-CM    1. Primary hypertension  I10 401.9 olmesartan (BENICAR) 40 mg tablet      2. Adjustment disorder with mixed anxiety and depressed mood  F43.23 309.28         Diagnoses and all orders for this visit:    1. Primary hypertension  -     olmesartan (BENICAR) 40 mg tablet; Take 1 Tablet by mouth daily. 2. Adjustment disorder with mixed anxiety and depressed mood    Other orders  -     buPROPion XL (WELLBUTRIN XL) 300 mg XL tablet; Take 1 Tablet by mouth daily. Follow-up and Dispositions    Return in about 6 months (around 5/1/2023) for htn, depression.        lab results and schedule of future lab studies reviewed with patient  reviewed diet, exercise and weight control  cardiovascular risk and specific lipid/LDL goals reviewed  reviewed medications and side effects in detail  Kristina Vang NP

## 2023-02-07 ENCOUNTER — OFFICE VISIT (OUTPATIENT)
Dept: INTERNAL MEDICINE CLINIC | Age: 43
End: 2023-02-07

## 2023-02-07 VITALS
HEIGHT: 63 IN | DIASTOLIC BLOOD PRESSURE: 89 MMHG | SYSTOLIC BLOOD PRESSURE: 146 MMHG | BODY MASS INDEX: 28.63 KG/M2 | HEART RATE: 87 BPM | TEMPERATURE: 98.6 F | RESPIRATION RATE: 16 BRPM | WEIGHT: 161.6 LBS | OXYGEN SATURATION: 100 %

## 2023-02-07 DIAGNOSIS — Z97.3 WEARS GLASSES: ICD-10-CM

## 2023-02-07 DIAGNOSIS — I10 PRIMARY HYPERTENSION: Primary | ICD-10-CM

## 2023-02-07 DIAGNOSIS — H53.8 BLURRED VISION: ICD-10-CM

## 2023-02-07 DIAGNOSIS — F43.23 ADJUSTMENT DISORDER WITH MIXED ANXIETY AND DEPRESSED MOOD: ICD-10-CM

## 2023-02-07 PROCEDURE — 3077F SYST BP >= 140 MM HG: CPT | Performed by: NURSE PRACTITIONER

## 2023-02-07 PROCEDURE — 99214 OFFICE O/P EST MOD 30 MIN: CPT | Performed by: NURSE PRACTITIONER

## 2023-02-07 PROCEDURE — 3079F DIAST BP 80-89 MM HG: CPT | Performed by: NURSE PRACTITIONER

## 2023-02-07 RX ORDER — OLMESARTAN MEDOXOMIL AND HYDROCHLOROTHIAZIDE 40/12.5 40; 12.5 MG/1; MG/1
1 TABLET ORAL DAILY
Qty: 30 TABLET | Refills: 3 | Status: SHIPPED | OUTPATIENT
Start: 2023-02-07

## 2023-02-07 NOTE — PROGRESS NOTES
RM 9    Chief Complaint   Patient presents with    Annual Wellness Visit    Dizziness     Medication discussion (blood pressure)       Visit Vitals  BP (!) 146/89 (BP 1 Location: Left upper arm, BP Patient Position: Sitting, BP Cuff Size: Adult)   Pulse 87   Temp 98.6 °F (37 °C) (Oral)   Resp 16   Ht 5' 3\" (1.6 m)   Wt 161 lb 9.6 oz (73.3 kg)   SpO2 100%   BMI 28.63 kg/m²       Health Maintenance Due   Topic Date Due    Cervical cancer screen  12/11/2015    COVID-19 Vaccine (3 - Booster) 04/26/2021    Flu Vaccine (1) 08/01/2022       1. \"Have you been to the ER, urgent care clinic since your last visit? Hospitalized since your last visit? \" No    2. \"Have you seen or consulted any other health care providers outside of the [address] [place] [address] since your last visit? \" No     3. For patients aged 39-70: Has the patient had a colonoscopy / FIT/ Cologuard? NA - based on age      If the patient is female:    4. For patients aged 41-77: Has the patient had a mammogram within the past 2 years? No    5. For patients aged 21-65: Has the patient had a pap smear? No     3 most recent PHQ Screens 2/7/2023   Little interest or pleasure in doing things Not at all   Feeling down, depressed, irritable, or hopeless Not at all   Total Score PHQ 2 0     No flowsheet data found. No flowsheet data found. No flowsheet data found. Learning Assessment 9/16/2014   PRIMARY LEARNER Patient   HIGHEST LEVEL OF EDUCATION - PRIMARY LEARNER  GRADUATED HIGH SCHOOL OR GED   BARRIERS PRIMARY LEARNER NONE   CO-LEARNER CAREGIVER No   PRIMARY LANGUAGE ENGLISH   LEARNER PREFERENCE PRIMARY DEMONSTRATION   ANSWERED BY patient   RELATIONSHIP SELF           AVS  education, follow up, and recommendations provided and addressed with patient.   services used to advise patient No

## 2023-02-07 NOTE — PROGRESS NOTES
Shelly Gagnon is a 41 y.o. female. HPI    Chief Complaint   Patient presents with    Annual Wellness Visit    Dizziness     Medication discussion (blood pressure)        BP about the same as office readings   She gets lightheaded  with tunnel vision when sun sets and rise  She has had headaches for the a few weeks. No history of headache disorder. Eye exam scheduled for next week   Wellbutrin very effective  In a much better place now emotionally     Review of Systems   Constitutional: Negative. Eyes:  Positive for blurred vision and photophobia. Negative for pain. Cardiovascular: Negative. Gastrointestinal: Negative. Genitourinary: Negative. Musculoskeletal: Negative. Skin: Negative. Neurological:  Positive for dizziness and headaches. Psychiatric/Behavioral:  Negative for depression. The patient does not have insomnia. Objective  Visit Vitals  BP (!) 146/89 (BP 1 Location: Left upper arm, BP Patient Position: Sitting, BP Cuff Size: Adult)   Pulse 87   Temp 98.6 °F (37 °C) (Oral)   Resp 16   Ht 5' 3\" (1.6 m)   Wt 161 lb 9.6 oz (73.3 kg)   LMP 12/05/2022 (Approximate)   SpO2 100%   BMI 28.63 kg/m²      Physical Exam  Constitutional:       General: She is not in acute distress. Appearance: Normal appearance. She is not ill-appearing. HENT:      Head: Normocephalic and atraumatic. Eyes:      Extraocular Movements: Extraocular movements intact. Right eye: Normal extraocular motion and no nystagmus. Left eye: Normal extraocular motion and no nystagmus. Conjunctiva/sclera: Conjunctivae normal.      Pupils: Pupils are equal, round, and reactive to light. Cardiovascular:      Rate and Rhythm: Normal rate and regular rhythm. Pulses: Normal pulses. Heart sounds: Normal heart sounds. Pulmonary:      Effort: Pulmonary effort is normal.      Breath sounds: Normal breath sounds. Skin:     General: Skin is warm and dry.    Neurological:      Mental Status: She is alert and oriented to person, place, and time. Mental status is at baseline. Cranial Nerves: No cranial nerve deficit. Gait: Gait normal.   Psychiatric:         Mood and Affect: Mood normal.         Behavior: Behavior normal.         Thought Content: Thought content normal.        Assessment & Plan    ICD-10-CM ICD-9-CM    1. Primary hypertension  I10 401.9 olmesartan-hydroCHLOROthiazide (BENICAR HCT) 40-12.5 mg per tablet      2. Adjustment disorder with mixed anxiety and depressed mood  F43.23 309.28       3. Wears glasses  Z97.3 V49.89       4. Blurred vision  H53.8 368.8         Diagnoses and all orders for this visit:    1. Primary hypertension  -     olmesartan-hydroCHLOROthiazide (BENICAR HCT) 40-12.5 mg per tablet; Take 1 Tablet by mouth daily. 2. Adjustment disorder with mixed anxiety and depressed mood    3. Wears glasses    4. Blurred vision    Follow-up and Dispositions    Return in about 4 weeks (around 3/7/2023) for htn. HTN uncontrolled. She agrees to medication adjustment, reviewed lifestyle management, BP monitoring, follow up evaluation   2. Controlled.   3,4 Strongly encouraged to see eye doctor  Consider head CT scan if no abnormal findings on eye exam     lab results and schedule of future lab studies reviewed with patient  reviewed diet, exercise and weight control  reviewed medications and side effects in detail  Emily Johansen NP

## 2023-03-03 ENCOUNTER — OFFICE VISIT (OUTPATIENT)
Dept: URGENT CARE | Age: 43
End: 2023-03-03

## 2023-03-03 VITALS
DIASTOLIC BLOOD PRESSURE: 73 MMHG | WEIGHT: 162.7 LBS | BODY MASS INDEX: 28.82 KG/M2 | TEMPERATURE: 98.1 F | RESPIRATION RATE: 18 BRPM | HEART RATE: 87 BPM | SYSTOLIC BLOOD PRESSURE: 109 MMHG | OXYGEN SATURATION: 100 %

## 2023-03-03 DIAGNOSIS — J06.9 VIRAL UPPER RESPIRATORY ILLNESS: Primary | ICD-10-CM

## 2023-03-03 DIAGNOSIS — J02.9 SORE THROAT: ICD-10-CM

## 2023-03-03 LAB
FLUAV+FLUBV AG NOSE QL IA.RAPID: NEGATIVE
FLUAV+FLUBV AG NOSE QL IA.RAPID: NEGATIVE
LOT NUMBER POC: NORMAL
S PYO AG THROAT QL: NEGATIVE
SARS-COV-2 PCR, POC: NEGATIVE
VALID INTERNAL CONTROL?: YES

## 2023-03-03 RX ORDER — ALBUTEROL SULFATE 90 UG/1
2 AEROSOL, METERED RESPIRATORY (INHALATION)
Qty: 1 EACH | Refills: 0 | Status: SHIPPED | OUTPATIENT
Start: 2023-03-03

## 2023-03-03 RX ORDER — BENZONATATE 200 MG/1
200 CAPSULE ORAL
Qty: 21 CAPSULE | Refills: 0 | Status: SHIPPED | OUTPATIENT
Start: 2023-03-03 | End: 2023-03-10

## 2023-03-03 NOTE — LETTER
NOTIFICATION RETURN TO WORK / SCHOOL    3/3/2023 9:02 AM    [facility] [id number]      To Whom It May Concern:    Alpha Arlyn is currently under the care of [facility]. She will return to work/school on: 03/06 OR 03/07/2023    If there are questions or concerns please have the patient contact our office.         Sincerely,      GHE PROVIDER

## 2023-03-03 NOTE — PROGRESS NOTES
Subjective: (As above and below)     The patient/guardian gave verbal consent to treat. Chief Complaint   Patient presents with    Generalized Body Aches     Patient presents for body aches, sore throat, cough, congestion, bilateral ear pain. Onset 3-4 days ago with no relief. Angeline Strong is a 41 y.o. female who presents for evaluation of : nasal congestion, sore throat, body achces, ear pain. Symptom onset 3-4 days ago . Preceding illness: none. No other identified aggravating or alleviating factors. Symptoms are constant and overall unchanged. Promotes no decrease in PO intake of fluids. Denies: severe lethargy, SOB, vomiting/diarrhea, chest pain, chest pain with breathing, severe headache, fevers . Known Exposure to COVID-19: no      ROS  Review of Systems - negative except as listed above    Reviewed PmHx, RxHx, FmHx, SocHx, AllgHx and updated in chart. Family History   Problem Relation Age of Onset    Liver Disease Mother         cirrhosis    Hypertension Mother     Alcohol abuse Mother     Hypertension Father     Diabetes Father         on insulin    Cancer Father         lung    Alcohol abuse Father     Alcohol abuse Sister     Anxiety Sister     Depression Sister     Alcohol abuse Brother     Anxiety Brother         on lexapro    Hypertension Maternal Grandmother     Stroke Maternal Grandmother 73        aneurysm     Hypertension Paternal Grandmother        Past Medical History:   Diagnosis Date    ADD (attention deficit disorder)     diagnosed in 9th grade by Dr. Lesly Gustafson, pediatrician.  on medication int since then    Anemia     Anxiety     Back pain     intermittent    Essential hypertension 3/24/2017    History of Clostridium difficile 08/2016    History of gestational diabetes 2016    gestational diabetes- on glyburide    Prediabetes 6/27/2017    [facility]    [place] [id number]      Social History     Socioeconomic History    Marital status: SINGLE    Number of children: 2   Occupational History    Occupation: Pediatric      Employer: [employer]   Tobacco Use    Smoking status: Never    Smokeless tobacco: Never   Vaping Use    Vaping Use: Never used   Substance and Sexual Activity    Alcohol use: Yes     Comment: 4 drinks max at a time, may drink once a month     Drug use: No    Sexual activity: Yes     Partners: Male     Birth control/protection: Injection     Comment: monogamous with bf since about 2001   Other Topics Concern     Service No    Blood Transfusions No    Caffeine Concern Yes     Comment: 3-4 cups coffee, 3-4 sodas daily    Occupational Exposure No    Hobby Hazards No    Sleep Concern No     Comment: 7-8 hours daily    Stress Concern No    Weight Concern No    Special Diet No    Back Care No    Exercise No     Comment: doesn't exercise   Social History Narrative    Works clerical for [employer]    Graduated from [employer]              Current Outpatient Medications   Medication Sig    albuterol (PROVENTIL HFA, VENTOLIN HFA, PROAIR HFA) 90 mcg/actuation inhaler Take 2 Puffs by inhalation every four (4) hours as needed for Wheezing. benzonatate (TESSALON) 200 mg capsule Take 1 Capsule by mouth three (3) times daily as needed for Cough for up to 7 days. olmesartan-hydroCHLOROthiazide (BENICAR HCT) 40-12.5 mg per tablet Take 1 Tablet by mouth daily. buPROPion XL (WELLBUTRIN XL) 300 mg XL tablet Take 1 Tablet by mouth daily. medroxyPROGESTERone (DEPO-PROVERA) 150 mg/mL injection 150 mg by IntraMUSCular route once. No current facility-administered medications for this visit. Objective:     Vitals:    03/03/23 0833   BP: 109/73   Pulse: 87   Resp: 18   Temp: 98.1 °F (36.7 °C)   SpO2: 100%   Weight: 162 lb 11.2 oz (73.8 kg)       Physical Exam  General appearance - appears well hydrated and does not appear toxic, no acute distress  Eyes - EOMs intact. Non injected.  No scleral icterus   Ears - no external swelling. TMs normal bilat. Nose - nasal congestion, sniffling. No purulent drainage  Mouth - OP clear without swelling, exudate or lesion. Mucus membranes moist. Uvula midline. Neck/Lymphatics - trachea midline, full AROM, no LAD of neck  Chest - Normal breathing effort no wheeze rales, rhonchi or diminishments bilaterally. Heart - RRR, no murmurs  Skin - no observable rashes or pallor  Neurologic- alert and oriented x 3  Psychiatric- normal mood, behavior and though content. Assessment/ Plan:     1. Viral upper respiratory illness    - POCT COVID-19, SARS-COV-2, PCR  - AMB POC BECKY INFLUENZA A/B TEST  - albuterol (PROVENTIL HFA, VENTOLIN HFA, PROAIR HFA) 90 mcg/actuation inhaler; Take 2 Puffs by inhalation every four (4) hours as needed for Wheezing. Dispense: 1 Each; Refill: 0  - benzonatate (TESSALON) 200 mg capsule; Take 1 Capsule by mouth three (3) times daily as needed for Cough for up to 7 days. Dispense: 21 Capsule; Refill: 0    2. Sore throat    - AMB POC STREP A DNA, AMP PROBE      Covid 19 test result today negative  Rapid strep negative  Rapid flu negative  No evidence suggesting complication of illness at this time. Will discharge home with close monitoring and follow up.   Albuterol and tessalon for cough  Supportive home care advised- maintain adequate fluid intake, over the counter Tylenol (for fever, aches, pains, chills), deep breathing exercises, nasal saline sprays for congestion, humidified air bedroom at night      Test Results:  Recent Results (from the past 6 hour(s))   POCT COVID-19, SARS-COV-2, PCR    Collection Time: 03/03/23  8:53 AM   Result Value Ref Range    SARS-COV-2 PCR, POC Negative Negative    VALID INTERNAL CONTROL POC Yes     LOT NUMBER     AMB POC STREP A DNA, AMP PROBE    Collection Time: 03/03/23  8:54 AM   Result Value Ref Range    VALID INTERNAL CONTROL POC Yes     Group A Strep Ag Negative Negative   AMB POC BECKY INFLUENZA A/B TEST Collection Time: 03/03/23  8:54 AM   Result Value Ref Range    VALID INTERNAL CONTROL POC Yes     Influenza A Ag POC Negative Negative    Influenza B Ag POC Negative Negative       Follow up: Follow up immediately for any new, worsening or changes or if symptoms are not improving over the next 5-7 days.          Gab Angela NP

## 2023-05-19 ENCOUNTER — OFFICE VISIT (OUTPATIENT)
Age: 43
End: 2023-05-19
Payer: MEDICAID

## 2023-05-19 VITALS
WEIGHT: 162.4 LBS | OXYGEN SATURATION: 100 % | HEIGHT: 63 IN | HEART RATE: 84 BPM | SYSTOLIC BLOOD PRESSURE: 120 MMHG | RESPIRATION RATE: 18 BRPM | BODY MASS INDEX: 28.77 KG/M2 | DIASTOLIC BLOOD PRESSURE: 78 MMHG | TEMPERATURE: 98 F

## 2023-05-19 DIAGNOSIS — Z86.32 PERSONAL HISTORY OF GESTATIONAL DIABETES: ICD-10-CM

## 2023-05-19 DIAGNOSIS — N92.0 MENORRHAGIA WITH REGULAR CYCLE: ICD-10-CM

## 2023-05-19 DIAGNOSIS — F90.2 ATTENTION-DEFICIT HYPERACTIVITY DISORDER, COMBINED TYPE: ICD-10-CM

## 2023-05-19 DIAGNOSIS — Z00.00 ENCOUNTER FOR WELLNESS EXAMINATION: ICD-10-CM

## 2023-05-19 DIAGNOSIS — R30.0 DYSURIA: Primary | ICD-10-CM

## 2023-05-19 DIAGNOSIS — I10 ESSENTIAL (PRIMARY) HYPERTENSION: ICD-10-CM

## 2023-05-19 LAB
BILIRUBIN, URINE, POC: NEGATIVE
BLOOD URINE, POC: NEGATIVE
GLUCOSE URINE, POC: NEGATIVE
KETONES, URINE, POC: NEGATIVE
LEUKOCYTE ESTERASE, URINE, POC: NEGATIVE
NITRITE, URINE, POC: NEGATIVE
PH, URINE, POC: 6 (ref 4.6–8)
PROTEIN,URINE, POC: NORMAL
SPECIFIC GRAVITY, URINE, POC: 1.03 (ref 1–1.03)
URINALYSIS CLARITY, POC: CLEAR
URINALYSIS COLOR, POC: YELLOW
UROBILINOGEN, POC: NORMAL

## 2023-05-19 PROCEDURE — 99214 OFFICE O/P EST MOD 30 MIN: CPT | Performed by: NURSE PRACTITIONER

## 2023-05-19 PROCEDURE — 3074F SYST BP LT 130 MM HG: CPT | Performed by: NURSE PRACTITIONER

## 2023-05-19 PROCEDURE — 81003 URINALYSIS AUTO W/O SCOPE: CPT | Performed by: NURSE PRACTITIONER

## 2023-05-19 PROCEDURE — PBSHW AMB POC URINALYSIS DIP STICK AUTO W/O MICRO: Performed by: NURSE PRACTITIONER

## 2023-05-19 PROCEDURE — 3078F DIAST BP <80 MM HG: CPT | Performed by: NURSE PRACTITIONER

## 2023-05-19 RX ORDER — CIPROFLOXACIN 500 MG/1
500 TABLET, FILM COATED ORAL 2 TIMES DAILY
Qty: 6 TABLET | Refills: 0 | Status: SHIPPED | OUTPATIENT
Start: 2023-05-19 | End: 2023-05-22

## 2023-05-20 LAB
ALBUMIN SERPL-MCNC: 4.5 G/DL (ref 3.8–4.8)
ALBUMIN/GLOB SERPL: 2 {RATIO} (ref 1.2–2.2)
ALP SERPL-CCNC: 31 IU/L (ref 44–121)
ALT SERPL-CCNC: 29 IU/L (ref 0–32)
AST SERPL-CCNC: 15 IU/L (ref 0–40)
BASOPHILS # BLD AUTO: 0 X10E3/UL (ref 0–0.2)
BASOPHILS NFR BLD AUTO: 0 %
BILIRUB SERPL-MCNC: 0.3 MG/DL (ref 0–1.2)
BUN SERPL-MCNC: 13 MG/DL (ref 6–24)
BUN/CREAT SERPL: 19 (ref 9–23)
CALCIUM SERPL-MCNC: 9.2 MG/DL (ref 8.7–10.2)
CHLORIDE SERPL-SCNC: 102 MMOL/L (ref 96–106)
CHOLEST SERPL-MCNC: 269 MG/DL (ref 100–199)
CO2 SERPL-SCNC: 21 MMOL/L (ref 20–29)
CREAT SERPL-MCNC: 0.69 MG/DL (ref 0.57–1)
EOSINOPHIL # BLD AUTO: 0 X10E3/UL (ref 0–0.4)
EOSINOPHIL NFR BLD AUTO: 0 %
ERYTHROCYTE [DISTWIDTH] IN BLOOD BY AUTOMATED COUNT: 12.1 % (ref 11.7–15.4)
GLOBULIN SER CALC-MCNC: 2.2 G/DL (ref 1.5–4.5)
GLUCOSE SERPL-MCNC: 100 MG/DL (ref 70–99)
HBA1C MFR BLD: 5.4 % (ref 4.8–5.6)
HCT VFR BLD AUTO: 34.5 % (ref 34–46.6)
HDLC SERPL-MCNC: 55 MG/DL
HGB BLD-MCNC: 11.7 G/DL (ref 11.1–15.9)
IMM GRANULOCYTES # BLD AUTO: 0 X10E3/UL (ref 0–0.1)
IMM GRANULOCYTES NFR BLD AUTO: 0 %
LDLC SERPL CALC-MCNC: 178 MG/DL (ref 0–99)
LYMPHOCYTES # BLD AUTO: 1.6 X10E3/UL (ref 0.7–3.1)
LYMPHOCYTES NFR BLD AUTO: 21 %
MCH RBC QN AUTO: 32.3 PG (ref 26.6–33)
MCHC RBC AUTO-ENTMCNC: 33.9 G/DL (ref 31.5–35.7)
MCV RBC AUTO: 95 FL (ref 79–97)
MONOCYTES # BLD AUTO: 0.2 X10E3/UL (ref 0.1–0.9)
MONOCYTES NFR BLD AUTO: 3 %
NEUTROPHILS # BLD AUTO: 5.9 X10E3/UL (ref 1.4–7)
NEUTROPHILS NFR BLD AUTO: 76 %
PLATELET # BLD AUTO: 244 X10E3/UL (ref 150–450)
POTASSIUM SERPL-SCNC: 4.4 MMOL/L (ref 3.5–5.2)
PROT SERPL-MCNC: 6.7 G/DL (ref 6–8.5)
RBC # BLD AUTO: 3.62 X10E6/UL (ref 3.77–5.28)
SODIUM SERPL-SCNC: 139 MMOL/L (ref 134–144)
TRIGL SERPL-MCNC: 193 MG/DL (ref 0–149)
VLDLC SERPL CALC-MCNC: 36 MG/DL (ref 5–40)
WBC # BLD AUTO: 7.8 X10E3/UL (ref 3.4–10.8)

## 2023-05-31 ASSESSMENT — ENCOUNTER SYMPTOMS
BACK PAIN: 0
GASTROINTESTINAL NEGATIVE: 1

## 2023-06-01 ENCOUNTER — PATIENT MESSAGE (OUTPATIENT)
Age: 43
End: 2023-06-01

## 2023-06-01 DIAGNOSIS — E78.00 PURE HYPERCHOLESTEROLEMIA: Primary | ICD-10-CM

## 2023-06-05 DIAGNOSIS — E78.00 PURE HYPERCHOLESTEROLEMIA: Primary | ICD-10-CM

## 2023-06-05 RX ORDER — ATORVASTATIN CALCIUM 20 MG/1
20 TABLET, FILM COATED ORAL DAILY
Qty: 90 TABLET | Refills: 1 | Status: SHIPPED | OUTPATIENT
Start: 2023-06-05

## 2023-06-12 ENCOUNTER — TELEPHONE (OUTPATIENT)
Age: 43
End: 2023-06-12

## 2023-06-12 NOTE — TELEPHONE ENCOUNTER
I have attempted to contact this patient by phone with the following results: no answer.  Mailbox is full    Ninoska Cleaning LPN

## 2023-07-19 ENCOUNTER — PATIENT MESSAGE (OUTPATIENT)
Age: 43
End: 2023-07-19

## 2023-07-24 NOTE — TELEPHONE ENCOUNTER
From: Neetu Olmos  To: Isabelle Zaidi  Sent: 7/19/2023 2:16 PM EDT  Subject: Increase     Can I increase my Burpoion?

## 2023-10-04 DIAGNOSIS — I10 ESSENTIAL HYPERTENSION: Primary | ICD-10-CM

## 2023-10-04 RX ORDER — OLMESARTAN MEDOXOMIL AND HYDROCHLOROTHIAZIDE 40/12.5 40; 12.5 MG/1; MG/1
1 TABLET ORAL DAILY
Qty: 30 TABLET | Refills: 1 | Status: SHIPPED | OUTPATIENT
Start: 2023-10-04

## 2023-10-04 NOTE — TELEPHONE ENCOUNTER
Writer sent pt a message via [facility] to clarify which medications pt is in need of a new prescription for via voice message pt had left.     -------------------------------------------------------  Pt left a voice message requesting a new prescriptions to be sent to the [facility] because  [id number] no longer excepts her insurance. [phone]    Duplicate requests:   - 11/01/2022 Wellbutrin- mg #90 with 3 refills,   - 06/05/2023 -  Lipitor 20 mg #90 with 1 refill,   - 08/07/2023 - Zoloft 50 mg #90 with 1 refill - prescriptions were sent to the [address] [place] [address]. Last appointment: 05/19/2023 DONATO Blood   Next appointment: 11/20/2023 MD Carlos Hendrickson   Previous refill encounter(s):   02/07/2023 Benicar-HCT #30 with 3 refills.      For Pharmacy Admin Tracking Only    Program: Medication Refill  Intervention Detail: Discontinued Rx: 3, reason: Duplicate Therapy and New Rx: 1, reason: Patient Preference  Time Spent (min): 10      Requested Prescriptions     Pending Prescriptions Disp Refills    olmesartan-hydroCHLOROthiazide (BENICAR HCT) 40-12.5 MG per tablet 30 tablet 0     Sig: Take 1 tablet by mouth daily

## 2023-11-17 SDOH — HEALTH STABILITY: PHYSICAL HEALTH: ON AVERAGE, HOW MANY DAYS PER WEEK DO YOU ENGAGE IN MODERATE TO STRENUOUS EXERCISE (LIKE A BRISK WALK)?: 0 DAYS

## 2023-11-20 ENCOUNTER — OFFICE VISIT (OUTPATIENT)
Age: 43
End: 2023-11-20
Payer: COMMERCIAL

## 2023-11-20 VITALS
HEIGHT: 63 IN | WEIGHT: 160.8 LBS | BODY MASS INDEX: 28.49 KG/M2 | RESPIRATION RATE: 16 BRPM | OXYGEN SATURATION: 100 % | TEMPERATURE: 98.6 F | SYSTOLIC BLOOD PRESSURE: 134 MMHG | DIASTOLIC BLOOD PRESSURE: 86 MMHG | HEART RATE: 91 BPM

## 2023-11-20 DIAGNOSIS — F41.1 GAD (GENERALIZED ANXIETY DISORDER): ICD-10-CM

## 2023-11-20 DIAGNOSIS — E78.00 PURE HYPERCHOLESTEROLEMIA: ICD-10-CM

## 2023-11-20 DIAGNOSIS — I10 ESSENTIAL HYPERTENSION: Primary | ICD-10-CM

## 2023-11-20 DIAGNOSIS — F33.40 MDD (RECURRENT MAJOR DEPRESSIVE DISORDER) IN REMISSION (HCC): ICD-10-CM

## 2023-11-20 LAB
COMMENT:: NORMAL
SPECIMEN HOLD: NORMAL

## 2023-11-20 PROCEDURE — 3079F DIAST BP 80-89 MM HG: CPT | Performed by: FAMILY MEDICINE

## 2023-11-20 PROCEDURE — 99214 OFFICE O/P EST MOD 30 MIN: CPT | Performed by: FAMILY MEDICINE

## 2023-11-20 PROCEDURE — 3075F SYST BP GE 130 - 139MM HG: CPT | Performed by: FAMILY MEDICINE

## 2023-11-20 SDOH — ECONOMIC STABILITY: FOOD INSECURITY: WITHIN THE PAST 12 MONTHS, YOU WORRIED THAT YOUR FOOD WOULD RUN OUT BEFORE YOU GOT MONEY TO BUY MORE.: NEVER TRUE

## 2023-11-20 SDOH — ECONOMIC STABILITY: FOOD INSECURITY: WITHIN THE PAST 12 MONTHS, THE FOOD YOU BOUGHT JUST DIDN'T LAST AND YOU DIDN'T HAVE MONEY TO GET MORE.: NEVER TRUE

## 2023-11-20 SDOH — ECONOMIC STABILITY: HOUSING INSECURITY
IN THE LAST 12 MONTHS, WAS THERE A TIME WHEN YOU DID NOT HAVE A STEADY PLACE TO SLEEP OR SLEPT IN A SHELTER (INCLUDING NOW)?: NO

## 2023-11-20 SDOH — ECONOMIC STABILITY: INCOME INSECURITY: HOW HARD IS IT FOR YOU TO PAY FOR THE VERY BASICS LIKE FOOD, HOUSING, MEDICAL CARE, AND HEATING?: NOT VERY HARD

## 2023-11-20 ASSESSMENT — ENCOUNTER SYMPTOMS
RESPIRATORY NEGATIVE: 1
EYES NEGATIVE: 1
COLOR CHANGE: 0
GASTROINTESTINAL NEGATIVE: 1
SHORTNESS OF BREATH: 0
COUGH: 0

## 2023-11-20 ASSESSMENT — ANXIETY QUESTIONNAIRES
GAD7 TOTAL SCORE: 9
6. BECOMING EASILY ANNOYED OR IRRITABLE: 1
2. NOT BEING ABLE TO STOP OR CONTROL WORRYING: 3
7. FEELING AFRAID AS IF SOMETHING AWFUL MIGHT HAPPEN: 1
1. FEELING NERVOUS, ANXIOUS, OR ON EDGE: 0
IF YOU CHECKED OFF ANY PROBLEMS ON THIS QUESTIONNAIRE, HOW DIFFICULT HAVE THESE PROBLEMS MADE IT FOR YOU TO DO YOUR WORK, TAKE CARE OF THINGS AT HOME, OR GET ALONG WITH OTHER PEOPLE: VERY DIFFICULT
5. BEING SO RESTLESS THAT IT IS HARD TO SIT STILL: 0
4. TROUBLE RELAXING: 1
3. WORRYING TOO MUCH ABOUT DIFFERENT THINGS: 3

## 2023-11-20 ASSESSMENT — PATIENT HEALTH QUESTIONNAIRE - PHQ9
SUM OF ALL RESPONSES TO PHQ QUESTIONS 1-9: 0
1. LITTLE INTEREST OR PLEASURE IN DOING THINGS: 0
SUM OF ALL RESPONSES TO PHQ9 QUESTIONS 1 & 2: 0
2. FEELING DOWN, DEPRESSED OR HOPELESS: 0
SUM OF ALL RESPONSES TO PHQ QUESTIONS 1-9: 0

## 2023-11-20 NOTE — PROGRESS NOTES
Tomas Bland (:  1980) is a 41 y.o. female,Established patient, here for evaluation of the following chief complaint(s):  Established New Doctor        Subjective   SUBJECTIVE/OBJECTIVE:  Pt presents as an est pt new to provider to est care. HM:   UTD on flu and Cervical cancer screenings. 1. HTN and XOL:   Complications: stroke/TIA:  none, renal disease: none, CVD: none, eye: none  Current medications:  benicar HCTZ, lipitor  Medication compliance: taking daily   ASA:  not currently   Statin: recently started in may. Due for labs today  Hx of hypercholesterolemia/hyperlipidemia:  yes. Medication side effects: none  Medication allergies or intolerances of previously tried medications: none  Home BP monitorins/80s and occ elevates due to changes in diet.    Complicated by combination OCPS  ROS: chest pain none including palpitations, stroke/TIA sx none, shortness of breath none, lightheadedness none, syncope none, leg swelling none, fatigue none, headache none   Recent labs:   Lab Results       Component                Value               Date                       CHOL                     269 (H)             2023                 CHOL                     211 (H)             2022            Lab Results       Component                Value               Date                       TRIG                     193 (H)             2023                 TRIG                     166 (H)             2022            Lab Results       Component                Value               Date                       HDL                      55                  2023                 HDL                      50                  2022            Lab Results       Component                Value               Date                       LDLCALC                  178 (H)             2023                 LDLCALC                  131 (H)             2022            Lab Results

## 2023-11-21 LAB
ANION GAP SERPL CALC-SCNC: 6 MMOL/L (ref 5–15)
BUN SERPL-MCNC: 13 MG/DL (ref 6–20)
BUN/CREAT SERPL: 13 (ref 12–20)
CALCIUM SERPL-MCNC: 9.2 MG/DL (ref 8.5–10.1)
CHLORIDE SERPL-SCNC: 109 MMOL/L (ref 97–108)
CHOLEST SERPL-MCNC: 204 MG/DL
CO2 SERPL-SCNC: 26 MMOL/L (ref 21–32)
CREAT SERPL-MCNC: 1.01 MG/DL (ref 0.55–1.02)
CREAT UR-MCNC: 170 MG/DL
GLUCOSE SERPL-MCNC: 90 MG/DL (ref 65–100)
HDLC SERPL-MCNC: 42 MG/DL
HDLC SERPL: 4.9 (ref 0–5)
LDLC SERPL CALC-MCNC: 108 MG/DL (ref 0–100)
MICROALBUMIN UR-MCNC: 1.88 MG/DL
MICROALBUMIN/CREAT UR-RTO: 11 MG/G (ref 0–30)
POTASSIUM SERPL-SCNC: 4.2 MMOL/L (ref 3.5–5.1)
SODIUM SERPL-SCNC: 141 MMOL/L (ref 136–145)
TRIGL SERPL-MCNC: 270 MG/DL
TSH SERPL DL<=0.05 MIU/L-ACNC: 1.36 UIU/ML (ref 0.36–3.74)
VLDLC SERPL CALC-MCNC: 54 MG/DL

## 2024-01-23 DIAGNOSIS — E78.00 PURE HYPERCHOLESTEROLEMIA: ICD-10-CM

## 2024-01-23 DIAGNOSIS — F41.1 GAD (GENERALIZED ANXIETY DISORDER): ICD-10-CM

## 2024-01-23 DIAGNOSIS — F33.40 MDD (RECURRENT MAJOR DEPRESSIVE DISORDER) IN REMISSION (HCC): Primary | ICD-10-CM

## 2024-01-23 RX ORDER — ATORVASTATIN CALCIUM 20 MG/1
20 TABLET, FILM COATED ORAL NIGHTLY
Qty: 90 TABLET | Refills: 3 | Status: SHIPPED | OUTPATIENT
Start: 2024-01-23 | End: 2025-01-22

## 2024-01-23 RX ORDER — BUPROPION HYDROCHLORIDE 300 MG/1
300 TABLET ORAL DAILY
Qty: 90 TABLET | Refills: 3 | Status: SHIPPED | OUTPATIENT
Start: 2024-01-23 | End: 2025-01-22

## 2024-01-23 NOTE — TELEPHONE ENCOUNTER
Last appt: 11/20/2023  Next appt: 05/20/2024  Last refill: 06/05/2023  for # of tabs 90  with # of refills 1  Last known prescriber:  Racheal Ramos APRN - NP  Last labs: 11/20/2023  Requested prescription:   atorvastatin (LIPITOR) 20 MG tablet  Pharmacy:   [facility] [address] [place] [address] [address] - P [phone] - F [phone]  Best callback number: [phone]        Last appt: 11/20/2023  Next appt: 05/20/2024  Last refill: 03/25/2023  for # of tabs 90  with # of refills 3  Last known prescriber:  Racheal Ramos NP  Last labs: 11/20/2023  Requested prescription: buPROPion XL (WELLBUTRIN XL) 300 mg XL tablet  Pharmacy: [facility] #[id number] [place], [place] [id number] - P [phone] - F [phone]  Best callback number: [phone]

## 2024-01-30 DIAGNOSIS — F33.40 MDD (RECURRENT MAJOR DEPRESSIVE DISORDER) IN REMISSION (HCC): ICD-10-CM

## 2024-01-30 DIAGNOSIS — I10 ESSENTIAL HYPERTENSION: ICD-10-CM

## 2024-01-30 DIAGNOSIS — E78.00 PURE HYPERCHOLESTEROLEMIA: ICD-10-CM

## 2024-01-30 DIAGNOSIS — F41.1 GAD (GENERALIZED ANXIETY DISORDER): ICD-10-CM

## 2024-01-30 RX ORDER — ATORVASTATIN CALCIUM 20 MG/1
20 TABLET, FILM COATED ORAL NIGHTLY
Qty: 90 TABLET | Refills: 3 | OUTPATIENT
Start: 2024-01-30 | End: 2025-01-29

## 2024-01-30 RX ORDER — OLMESARTAN MEDOXOMIL AND HYDROCHLOROTHIAZIDE 40/12.5 40; 12.5 MG/1; MG/1
1 TABLET ORAL DAILY
Qty: 90 TABLET | Refills: 1 | OUTPATIENT
Start: 2024-01-30

## 2024-01-30 RX ORDER — BUPROPION HYDROCHLORIDE 300 MG/1
300 TABLET ORAL DAILY
Qty: 90 TABLET | Refills: 3 | OUTPATIENT
Start: 2024-01-30 | End: 2025-01-29

## 2024-01-30 NOTE — TELEPHONE ENCOUNTER
Duplicate request:   11/20/2023 Zoloft 50 mg #90 with 1 refill ([id number]),  12/20/2023 Benicar-HCT #90 with 1 refill ([id number]),  01/23/2024:  - Wellbutrin- mg #90 with 3 refills,   - Lipitor 20 mg #90 with 3 refills. These prescriptions were sent to [facility] #[id number].     Writer sent pt a message via Shoutlet.     For Pharmacy Admin Tracking Only    Program: Medication Refill  Intervention Detail: Discontinued Rx: 4, reason: Duplicate Therapy  Time Spent (min): 5    Requested Prescriptions     Pending Prescriptions Disp Refills    sertraline (ZOLOFT) 50 MG tablet 90 tablet 1     Sig: Take 1 tablet by mouth daily    olmesartan-hydroCHLOROthiazide (BENICAR HCT) 40-12.5 MG per tablet 90 tablet 1     Sig: Take 1 tablet by mouth daily    atorvastatin (LIPITOR) 20 MG tablet 90 tablet 3     Sig: Take 1 tablet by mouth at bedtime    buPROPion (WELLBUTRIN XL) 300 MG extended release tablet 90 tablet 3     Sig: Take 1 tablet by mouth daily

## 2024-02-02 DIAGNOSIS — F33.40 MDD (RECURRENT MAJOR DEPRESSIVE DISORDER) IN REMISSION (HCC): ICD-10-CM

## 2024-02-02 DIAGNOSIS — E78.00 PURE HYPERCHOLESTEROLEMIA: ICD-10-CM

## 2024-02-02 DIAGNOSIS — I10 ESSENTIAL HYPERTENSION: ICD-10-CM

## 2024-02-02 DIAGNOSIS — F41.1 GAD (GENERALIZED ANXIETY DISORDER): ICD-10-CM

## 2024-02-02 RX ORDER — ATORVASTATIN CALCIUM 20 MG/1
20 TABLET, FILM COATED ORAL NIGHTLY
Qty: 90 TABLET | Refills: 3 | OUTPATIENT
Start: 2024-02-02 | End: 2025-02-01

## 2024-02-02 RX ORDER — BUPROPION HYDROCHLORIDE 300 MG/1
300 TABLET ORAL DAILY
Qty: 90 TABLET | Refills: 3 | OUTPATIENT
Start: 2024-02-02 | End: 2025-02-01

## 2024-02-02 RX ORDER — OLMESARTAN MEDOXOMIL AND HYDROCHLOROTHIAZIDE 40/12.5 40; 12.5 MG/1; MG/1
1 TABLET ORAL DAILY
Qty: 90 TABLET | Refills: 1 | OUTPATIENT
Start: 2024-02-02

## 2024-02-02 NOTE — TELEPHONE ENCOUNTER
Writer contacted the [facility] #[id number] to follow up on the receivalof the e-scribed prescriptions for the Lipitor 20 mg and Wellbutrin- mg.     Per pharmacist in charge the prescriptions were received however the pt's insurance is not contracted with the [facility] and the prescription on holding on the pt's file.   ---------------------------------------------------------------  02/09/2024 Pt left a voice message following up on refill request.      [phone]  ------------------------------------------------------------------  Duplicate request:   12/20/2023 Benicar 40-12.5 mg #90 with 1 refill was sent to [facility] #[id number],   01/23/2024:  - Wellbutrin- mg #90 with 3 refills,   - Lipitor 20 mg #90 with 3 refills both prescriptions were sent to [facility] #[id number].     The 3 prescriptions still have active refills on the original prescriptions and pt can contact the preferred pharmacy of choice to have the prescriptions transferred to the preferred pharmacy.     Writer sent pt a message via Fash[employer].     Last appointment: 11/20/2023 MD Adan   Next appointment: 05/20/2024 MD Adan     For Pharmacy Admin Tracking Only    Program: Medication Refill  Intervention Detail: Discontinued Rx: 3, reason: Duplicate Therapy  Time Spent (min): 5    Requested Prescriptions     Pending Prescriptions Disp Refills    olmesartan-hydroCHLOROthiazide (BENICAR HCT) 40-12.5 MG per tablet 90 tablet 1     Sig: Take 1 tablet by mouth daily    atorvastatin (LIPITOR) 20 MG tablet 90 tablet 3     Sig: Take 1 tablet by mouth at bedtime    buPROPion (WELLBUTRIN XL) 300 MG extended release tablet 90 tablet 3     Sig: Take 1 tablet by mouth daily

## 2024-02-09 DIAGNOSIS — F41.1 GAD (GENERALIZED ANXIETY DISORDER): ICD-10-CM

## 2024-02-09 DIAGNOSIS — E78.00 PURE HYPERCHOLESTEROLEMIA: ICD-10-CM

## 2024-02-09 DIAGNOSIS — F33.40 MDD (RECURRENT MAJOR DEPRESSIVE DISORDER) IN REMISSION (HCC): ICD-10-CM

## 2024-02-09 RX ORDER — BUPROPION HYDROCHLORIDE 300 MG/1
300 TABLET ORAL DAILY
Qty: 90 TABLET | Refills: 3 | Status: SHIPPED | OUTPATIENT
Start: 2024-02-09 | End: 2025-02-08

## 2024-02-09 RX ORDER — ATORVASTATIN CALCIUM 20 MG/1
20 TABLET, FILM COATED ORAL NIGHTLY
Qty: 90 TABLET | Refills: 3 | Status: SHIPPED | OUTPATIENT
Start: 2024-02-09 | End: 2025-02-08

## 2024-02-09 NOTE — TELEPHONE ENCOUNTER
Dr. Adan can pt have new prescription sent to the [facility] for the Lipitor and the Wellbutrin-XL?     The previous prescriptions were sent to the [facility].The pt's' insurance is not contracted with the [facility].   -----------------------------------------------------------------  Writer contacted the [facility] #[id number] to follow up on the receivalof the e-scribed prescriptions for the Lipitor 20 mg and Wellbutrin- mg.      Per pharmacist in charge the prescriptions were received however the pt's insurance is not contracted with the [facility] and the prescription on holding on the pt's file.   ---------------------------------------------------------------  02/09/2024 Pt left a voice message following up on refill request.       [phone]    Last appointment: 11/20/2023 MD Adan   Next appointment: 05/20/2024 MD Adan   Previous refill encounter(s):   01/23/2024:  - Wellbutrin-XL #90 with 3 refills,   - Lipitor #90 with 3 refills.     For Pharmacy Admin Tracking Only    Program: Medication Refill  Intervention Detail: New Rx: 2, reason: Patient Preference  Time Spent (min): 10    Requested Prescriptions     Pending Prescriptions Disp Refills    atorvastatin (LIPITOR) 20 MG tablet 90 tablet 3     Sig: Take 1 tablet by mouth at bedtime    buPROPion (WELLBUTRIN XL) 300 MG extended release tablet 90 tablet 3     Sig: Take 1 tablet by mouth daily

## 2024-02-11 ENCOUNTER — PATIENT MESSAGE (OUTPATIENT)
Age: 44
End: 2024-02-11

## 2024-02-11 DIAGNOSIS — F41.9 ANXIETY DUE TO INVASIVE PROCEDURE: Primary | ICD-10-CM

## 2024-02-13 RX ORDER — HYDROXYZINE PAMOATE 25 MG/1
25 CAPSULE ORAL 3 TIMES DAILY PRN
Qty: 3 CAPSULE | Refills: 0 | Status: SHIPPED | OUTPATIENT
Start: 2024-02-13 | End: 2024-02-14

## 2024-02-13 RX ORDER — BUSPIRONE HYDROCHLORIDE 10 MG/1
10 TABLET ORAL ONCE
Qty: 1 TABLET | Refills: 0 | Status: SHIPPED | OUTPATIENT
Start: 2024-02-13 | End: 2024-02-13

## 2024-02-13 NOTE — TELEPHONE ENCOUNTER
From: Angy Knutson  To: Dr. Lola Adan  Sent: 2/11/2024 2:46 AM EST  Subject: Dentist     I have been to the dentist twice recently and have not been able to be seen because I am so terrified of the dentist my blood pressure is to high they will not do any dental work on me. They even try to tilt me a lay my head down. The second checked it at work right before I left to go to the dentist and it was just fine. They ask if I could contact my primary physician to prescribe something before I go back because there is an excessive amount of worth they have to do over a period of time because I have neglected going to the dentist for so many years.   Thank you for your time.

## 2024-07-10 DIAGNOSIS — I10 ESSENTIAL HYPERTENSION: ICD-10-CM

## 2024-07-10 RX ORDER — OLMESARTAN MEDOXOMIL AND HYDROCHLOROTHIAZIDE 40/12.5 40; 12.5 MG/1; MG/1
1 TABLET ORAL DAILY
Qty: 30 TABLET | Refills: 0 | Status: SHIPPED | OUTPATIENT
Start: 2024-07-10

## 2024-07-10 NOTE — TELEPHONE ENCOUNTER
Last appointment: 11/20/2023 [facility]   Next appointment: Nothing scheduled   Previous refill encounter(s):   12/20/2023 Benicar-HCT #90 with 1 refill.     For Pharmacy Admin Tracking Only    Program: Medication Refill  Intervention Detail: New Rx: 1, reason: Patient Preference  Time Spent (min): 5    Requested Prescriptions     Pending Prescriptions Disp Refills    olmesartan-hydroCHLOROthiazide (BENICAR HCT) 40-12.5 MG per tablet [Pharmacy Med Name: Olmesartan Medoxomil-HCTZ 40-12.5 MG Oral Tablet] 90 tablet 0     Sig: Take 1 tablet by mouth once daily

## 2024-09-03 DIAGNOSIS — I10 ESSENTIAL HYPERTENSION: ICD-10-CM

## 2024-09-04 RX ORDER — OLMESARTAN MEDOXOMIL AND HYDROCHLOROTHIAZIDE 40/12.5 40; 12.5 MG/1; MG/1
TABLET ORAL
Qty: 30 TABLET | Refills: 0 | Status: SHIPPED | OUTPATIENT
Start: 2024-09-04